# Patient Record
Sex: FEMALE | Race: WHITE | Employment: FULL TIME | URBAN - METROPOLITAN AREA
[De-identification: names, ages, dates, MRNs, and addresses within clinical notes are randomized per-mention and may not be internally consistent; named-entity substitution may affect disease eponyms.]

---

## 2016-12-13 LAB
ANTIBODY SCREEN, EXTERNAL: NEGATIVE
GRBS, EXTERNAL: POSITIVE
HBSAG, EXTERNAL: NEGATIVE
HIV, EXTERNAL: NEGATIVE
RPR, EXTERNAL: NONREACTIVE
RUBELLA, EXTERNAL: NORMAL
T. PALLIDUM, EXTERNAL: NEGATIVE
TYPE, ABO & RH, EXTERNAL: NORMAL
URINALYSIS, EXTERNAL: NORMAL

## 2017-02-21 ENCOUNTER — HOSPITAL ENCOUNTER (OUTPATIENT)
Dept: PERINATAL CARE | Age: 42
Discharge: HOME | End: 2017-02-21
Attending: OBSTETRICS & GYNECOLOGY
Payer: COMMERCIAL

## 2017-02-21 PROCEDURE — 76817 TRANSVAGINAL US OBSTETRIC: CPT | Performed by: OBSTETRICS & GYNECOLOGY

## 2017-02-21 PROCEDURE — 76811 OB US DETAILED SNGL FETUS: CPT | Performed by: OBSTETRICS & GYNECOLOGY

## 2017-02-28 ENCOUNTER — ANESTHESIA EVENT (OUTPATIENT)
Dept: LABOR AND DELIVERY | Age: 42
End: 2017-02-28
Payer: COMMERCIAL

## 2017-02-28 ENCOUNTER — ANESTHESIA (OUTPATIENT)
Dept: LABOR AND DELIVERY | Age: 42
End: 2017-02-28
Payer: COMMERCIAL

## 2017-02-28 ENCOUNTER — SURGERY (OUTPATIENT)
Age: 42
End: 2017-02-28

## 2017-02-28 ENCOUNTER — HOSPITAL ENCOUNTER (EMERGENCY)
Age: 42
Discharge: HOME | End: 2017-03-01
Attending: OBSTETRICS & GYNECOLOGY | Admitting: OBSTETRICS & GYNECOLOGY
Payer: COMMERCIAL

## 2017-02-28 ENCOUNTER — HOSPITAL ENCOUNTER (OUTPATIENT)
Dept: PERINATAL CARE | Age: 42
Discharge: HOME | End: 2017-02-28
Attending: OBSTETRICS & GYNECOLOGY
Payer: COMMERCIAL

## 2017-02-28 LAB
ABO + RH BLD: NORMAL
BLOOD GROUP ANTIBODIES SERPL: NORMAL
ERYTHROCYTE [DISTWIDTH] IN BLOOD BY AUTOMATED COUNT: 12.7 % (ref 11.5–14.5)
GLUCOSE FLD-MCNC: 37 MG/DL
HCT VFR BLD AUTO: 31.1 % (ref 35–47)
HGB BLD-MCNC: 10.4 G/DL (ref 11.5–16)
LDH FLD L TO P-CCNC: 84 U/L
MCH RBC QN AUTO: 27.9 PG (ref 26–34)
MCHC RBC AUTO-ENTMCNC: 33.4 G/DL (ref 30–36.5)
MCV RBC AUTO: 83.4 FL (ref 80–99)
PLATELET # BLD AUTO: 129 K/UL (ref 150–400)
RBC # BLD AUTO: 3.73 M/UL (ref 3.8–5.2)
SPECIMEN EXP DATE BLD: NORMAL
SPECIMEN SOURCE FLD: NORMAL
SPECIMEN SOURCE FLD: NORMAL
WBC # BLD AUTO: 5.8 K/UL (ref 3.6–11)

## 2017-02-28 PROCEDURE — 59000 AMNIOCENTESIS DIAGNOSTIC: CPT | Performed by: OBSTETRICS & GYNECOLOGY

## 2017-02-28 PROCEDURE — 74011000250 HC RX REV CODE- 250

## 2017-02-28 PROCEDURE — 74011250637 HC RX REV CODE- 250/637: Performed by: OBSTETRICS & GYNECOLOGY

## 2017-02-28 PROCEDURE — 74011250636 HC RX REV CODE- 250/636: Performed by: OBSTETRICS & GYNECOLOGY

## 2017-02-28 PROCEDURE — 77030007866 HC KT SPN ANES BBMI -B: Performed by: ANESTHESIOLOGY

## 2017-02-28 PROCEDURE — 85027 COMPLETE CBC AUTOMATED: CPT | Performed by: OBSTETRICS & GYNECOLOGY

## 2017-02-28 PROCEDURE — 36415 COLL VENOUS BLD VENIPUNCTURE: CPT | Performed by: OBSTETRICS & GYNECOLOGY

## 2017-02-28 PROCEDURE — 76817 TRANSVAGINAL US OBSTETRIC: CPT | Performed by: OBSTETRICS & GYNECOLOGY

## 2017-02-28 PROCEDURE — 82945 GLUCOSE OTHER FLUID: CPT | Performed by: OBSTETRICS & GYNECOLOGY

## 2017-02-28 PROCEDURE — 99283 EMERGENCY DEPT VISIT LOW MDM: CPT

## 2017-02-28 PROCEDURE — 75410000003 HC RECOV DEL/VAG/CSECN EA 0.5 HR: Performed by: OBSTETRICS & GYNECOLOGY

## 2017-02-28 PROCEDURE — 83615 LACTATE (LD) (LDH) ENZYME: CPT | Performed by: OBSTETRICS & GYNECOLOGY

## 2017-02-28 PROCEDURE — 76060000078 HC EPIDURAL ANESTHESIA: Performed by: OBSTETRICS & GYNECOLOGY

## 2017-02-28 PROCEDURE — 76010000394 HC LDRP PROCEDURE 1.5 TO 2 HR: Performed by: OBSTETRICS & GYNECOLOGY

## 2017-02-28 PROCEDURE — 99218 HC RM OBSERVATION: CPT

## 2017-02-28 PROCEDURE — 76946 ECHO GUIDE FOR AMNIOCENTESIS: CPT | Performed by: OBSTETRICS & GYNECOLOGY

## 2017-02-28 PROCEDURE — 87205 SMEAR GRAM STAIN: CPT | Performed by: OBSTETRICS & GYNECOLOGY

## 2017-02-28 PROCEDURE — 82106 ALPHA-FETOPROTEIN AMNIOTIC: CPT | Performed by: OBSTETRICS & GYNECOLOGY

## 2017-02-28 PROCEDURE — 77030012890

## 2017-02-28 PROCEDURE — 86900 BLOOD TYPING SEROLOGIC ABO: CPT | Performed by: OBSTETRICS & GYNECOLOGY

## 2017-02-28 PROCEDURE — 74011250636 HC RX REV CODE- 250/636

## 2017-02-28 PROCEDURE — 88269 CHROMOSOME ANALYS AMNIOTIC: CPT | Performed by: OBSTETRICS & GYNECOLOGY

## 2017-02-28 RX ORDER — SODIUM CHLORIDE, SODIUM LACTATE, POTASSIUM CHLORIDE, CALCIUM CHLORIDE 600; 310; 30; 20 MG/100ML; MG/100ML; MG/100ML; MG/100ML
125 INJECTION, SOLUTION INTRAVENOUS CONTINUOUS
Status: DISCONTINUED | OUTPATIENT
Start: 2017-02-28 | End: 2017-03-01 | Stop reason: HOSPADM

## 2017-02-28 RX ORDER — CEFAZOLIN SODIUM IN 0.9 % NACL 2 G/50 ML
2 INTRAVENOUS SOLUTION, PIGGYBACK (ML) INTRAVENOUS EVERY 8 HOURS
Status: COMPLETED | OUTPATIENT
Start: 2017-02-28 | End: 2017-03-01

## 2017-02-28 RX ORDER — CEFAZOLIN SODIUM IN 0.9 % NACL 2 G/50 ML
2 INTRAVENOUS SOLUTION, PIGGYBACK (ML) INTRAVENOUS ONCE
Status: COMPLETED | OUTPATIENT
Start: 2017-02-28 | End: 2017-02-28

## 2017-02-28 RX ORDER — PROGESTERONE 200 MG/1
200 CAPSULE ORAL DAILY
COMMUNITY
End: 2017-03-01

## 2017-02-28 RX ORDER — ACETAMINOPHEN 500 MG
500 TABLET ORAL
Status: DISCONTINUED | OUTPATIENT
Start: 2017-02-28 | End: 2017-03-01 | Stop reason: HOSPADM

## 2017-02-28 RX ORDER — BUPIVACAINE HYDROCHLORIDE 7.5 MG/ML
INJECTION, SOLUTION EPIDURAL; RETROBULBAR AS NEEDED
Status: DISCONTINUED | OUTPATIENT
Start: 2017-02-28 | End: 2017-02-28 | Stop reason: HOSPADM

## 2017-02-28 RX ORDER — ZOLPIDEM TARTRATE 5 MG/1
5 TABLET ORAL
Status: DISCONTINUED | OUTPATIENT
Start: 2017-02-28 | End: 2017-03-01 | Stop reason: HOSPADM

## 2017-02-28 RX ORDER — SODIUM CHLORIDE, SODIUM LACTATE, POTASSIUM CHLORIDE, CALCIUM CHLORIDE 600; 310; 30; 20 MG/100ML; MG/100ML; MG/100ML; MG/100ML
INJECTION, SOLUTION INTRAVENOUS
Status: DISCONTINUED | OUTPATIENT
Start: 2017-02-28 | End: 2017-02-28 | Stop reason: HOSPADM

## 2017-02-28 RX ADMIN — SODIUM CHLORIDE, SODIUM LACTATE, POTASSIUM CHLORIDE, CALCIUM CHLORIDE: 600; 310; 30; 20 INJECTION, SOLUTION INTRAVENOUS at 13:15

## 2017-02-28 RX ADMIN — CEFAZOLIN 2 G: 1 INJECTION, POWDER, FOR SOLUTION INTRAMUSCULAR; INTRAVENOUS; PARENTERAL at 20:54

## 2017-02-28 RX ADMIN — ZOLPIDEM TARTRATE 5 MG: 5 TABLET ORAL at 22:17

## 2017-02-28 RX ADMIN — BUPIVACAINE HYDROCHLORIDE 9 MG: 7.5 INJECTION, SOLUTION EPIDURAL; RETROBULBAR at 13:21

## 2017-02-28 RX ADMIN — CEFAZOLIN 2 G: 1 INJECTION, POWDER, FOR SOLUTION INTRAMUSCULAR; INTRAVENOUS; PARENTERAL at 13:15

## 2017-02-28 RX ADMIN — SODIUM CHLORIDE, SODIUM LACTATE, POTASSIUM CHLORIDE, CALCIUM CHLORIDE: 600; 310; 30; 20 INJECTION, SOLUTION INTRAVENOUS at 13:59

## 2017-02-28 RX ADMIN — ACETAMINOPHEN 500 MG: 500 TABLET, FILM COATED ORAL at 15:47

## 2017-02-28 NOTE — PROGRESS NOTES
65 Dr. Nitesh Garrett at bedside discussing [address] [facility].    [phone] Second sample drawn and sent to lab.

## 2017-02-28 NOTE — ANESTHESIA PREPROCEDURE EVALUATION
Anesthetic History   No history of anesthetic complications            Review of Systems / Medical History  Patient summary reviewed, nursing notes reviewed and pertinent labs reviewed    Pulmonary  Within defined limits                 Neuro/Psych   Within defined limits           Cardiovascular  Within defined limits                     GI/Hepatic/Renal  Within defined limits              Endo/Other  Within defined limits           Other Findings              Physical Exam    Airway  Mallampati: II  TM Distance: > 6 cm  Neck ROM: normal range of motion   Mouth opening: Normal     Cardiovascular  Regular rate and rhythm,  S1 and S2 normal,  no murmur, click, rub, or gallop             Dental  No notable dental hx       Pulmonary  Breath sounds clear to auscultation               Abdominal  GI exam deferred       Other Findings            Anesthetic Plan    ASA: 2  Anesthesia type: spinal          Induction: Intravenous  Anesthetic plan and risks discussed with: Patient

## 2017-02-28 NOTE — IP AVS SNAPSHOT
Current Discharge Medication List  
  
Take these medications as directed Dose & Instructions Dispensing Information Comments Morning Noon Evening Bedtime  
 prenatal multivit-ca-min-fe-fa Tab Your next dose is: Today, Tomorrow Other:  ____________ Take  by mouth. Refills:  0

## 2017-02-28 NOTE — PROGRESS NOTES
Spiritual Care Assessment/Progress Notes    Concha Lombard [id number]  [id number]    1975  46 y.o.  female    Patient Telephone Number: [phone] (home)   Catholic Affiliation:    Language: English   Extended Emergency Contact Information  Primary Emergency Contact: Bao David Phone: [phone]  Relation: Spouse   There are no active problems to display for this patient. Date: 2/28/2017       Level of Catholic/Spiritual Activity:  [x]         Involved in omkar tradition/spiritual practice    []         Not involved in omkar tradition/spiritual practice  []         Spiritually oriented    []         Claims no spiritual orientation    []         seeking spiritual identity  []         Feels alienated from Cheondoism practice/tradition  []         Feels angry about Cheondoism practice/tradition  [x]         Spirituality/Cheondoism tradition is a resource for coping at this time.   []         Not able to assess due to medical condition    Services Provided Today:  []         crisis intervention    []         reading Scriptures  [x]         spiritual assessment    [x]         prayer  [x]         empathic listening/emotional support  []         rites and rituals (cite in comments)  []         life review     []         Cheondoism support  []         theological development   []         advocacy  []         ethical dialog     []         blessing  []         bereavement support    []         support to family  []         anticipatory grief support   []         help with AMD  []         spiritual guidance    []         meditation      Spiritual Care Needs  [x]         Emotional Support  [x]         Spiritual/Catholic Care  []         Loss/Adjustment  []         Advocacy/Referral                /Ethics  []         No needs expressed at               this time  []         Other: (note in               comments)  [address]  [x]         Follow up visits with pt/family  []         Provide materials  []         Schedule sacraments  []         Contact Community               Clergy  []         Follow up as needed  []         Other: (note in               comments)     Comments: Initial visit with patient and  Lul Wen) in [phone] per patient request due to concerns about missing their Reji Wednesday service. Patient shared their history of a miscarriage and difficulty conceiving as well the emotional events of today that lead to her hospitalization. Patient maintains a hopeful but realistic outlook. Patient and  are comforted by their [address] and were extremely appreciative of the availability of Spiritual Care services. Also informed patient and  of Goldie on channel 39 and [employer]. Chaplain Jahaira Gann, James.    Paging Service [phone])

## 2017-02-28 NOTE — H&P
Maternal-Fetal Medicine    Ms. Dimitri Ferrer  at 21-weeks gestation, with cervical incompetence returned for ultrasound evaluation today. She conceived by IVF-ET and PGD and non-invasive prenatal screening showed no increased risk for fetal aneuploidies. On ultrasound performed last week, the cervix was short (7 millimeters) and, after counseling, the patient opted for vaginal progesterone treatment. She has been taking progesterone for one week now. Patient does not have symptoms of pelvic pressure or vaginal bleeding. PMH: She does not have any chronic medical conditions. PSH: Laparoscopic myomectomy and salpingectomy. Hysteroscopies and D&C procedures. No history of cervical surgeries. Obstetric history : 1 early miscarriage. Gyn history: No history of abnormal Pap smears. History of genital herpes infection with no active lesions now. She denies gonococcal or chlamydial infections. P/E: Patient is comfortably sitting up; not in distress. Abd: Soft gravid uterus; no tenderness. Before ultrasound, I explained that I will be performing transvaginal ultrasound and sterile-speculum examination. On ultrasound, amniotic fluid is normal and good fetal activity is seen. Cephalic presentation. On transabdominal scan, the cervical canal is dilated. On transvaginal ultrasound, the cervical canal is completely dilated and there is no residual measurable cervical length. On sterile-speculum examination, the cervix is about 3 to 4 centimeters dilated and the membranes are outside the external os into the vagina. It appears it is just outside the external os. On visual inspection, no herpetic lesions are seen on the external genitalia. I counseled the couple on the following:  Advanced cervical dilation: I explained the finding with the help of ultrasound images and diagrams.  I also informed them that the information regarding the extent of membrane prolapse is difficult to evaluate on office examination and will be possible only under anesthesia. I discussed the following options: a) expectant management with no surgical intervention or b) rescue cerclage. I discussed cerclage procedure. I also informed them that a decision may be made not to perform cerclage if the findings under anesthesia show that the membranes could not be retracted into the cervical canal. Also, there is a greater risk of rupture of membranes while attempting to perform the procedure. Other complications of cerclage include infection, hemorrhage, and injury to bladder or bowel (rare). I told them that the success rate (if cerclage is possible) is probably around 10 to 20% of carrying the pregnancy beyond 32 weeks. Expectant management or cerclage procedure can be associated with continuation of pregnancy to variable gestational age, and there is a high-likelihood of delivery under 32 weeks gestation. Delivery of an extremely-premature can be associated with neurological complications including cerebral palsy. I discussed amniocentesis that is an essential part to perform cerclage. Amniocentesis is performed primarily to rule out intraamniotic infection, and if infection is present, it precludes cerclage procedure. The couple discussed between themselves for several minutes and opted to have examination under anesthesia and cerclage procedure if possible. Patient does not want expectant management. She also consented for amniocentesis. In addition to infectious work-up, she requested fetal karyotype. I discussed the procedure-related loss of about 1 in 400, but her chances of miscarriage is very high that is independent of the procedure. I also informed her that additional fluid may be withdrawn to decompress the amniotic sac (to facilitate cerclage), but the benefit is doubtful. After amniocentesis, the patient was taken to L&D in a wheelchair.  We will perform examination under anesthesia and cerclage (if possible) after intraamniotic infection is ruled out. After informed consent amniocentesis was performed. After discarding initial 1 mL of amniotic fluid, about 10 mL of amniotic fluid was withdrawn and sent for WBC, glucose, gram stain, LDH and culture. Additional 40 milliliters of amniotic fluid was withdrawn and was sent for fetal karyotype. Patient tolerated the procedure well. The amniotic fluid specimen was taken to L&D by our nurse, Adarsh Rubio and the fluid will be sent from there to the lab. Results of amniocentesis:  -Glucose 37  -LD 84  -Gram stain: No definite organism is seen (preliminary). Plan: Proceed with cerclage. Informed consent for the procedure was obtained. Discussed with Dr. Tad Smith.

## 2017-02-28 NOTE — PROGRESS NOTES
1045- Pt arrived in L&D via wheelchair from Dr. Adilia Savage office for a rescue cerclage. Pt states positive fetal movement and denies any leaking or bleeding. Pt's cervix was visually dilated to 4cm per Dr. Niya Hubbard, plan to admit and do rescue cerclage as long amniotic fluid is negative for infection. 1310- Pt off monitor for transport via wheelchair to OR 2 for cerclage     1940- Bedside and Verbal shift change report given to ELIZABETH Mooney (oncoming nurse) by ELIZABETH Singh RN (offgoing nurse). Report included the following information SBAR, Intake/Output, MAR and Recent Results.

## 2017-02-28 NOTE — BRIEF OP NOTE
BRIEF OPERATIVE NOTE    Date of Procedure: 2/28/2017   Preoperative Diagnosis: 21-weeks' gestation with INCOMPETENT CERVIX  Postoperative Diagnosis: Same as above with cerclage. Procedure(s):CERCLAGE  Surgeon(s) and Role:     * Rogelio Crystal MD - Primary     * Tisha Izquierdo MD - Assisting            Surgical Staff:  Circ-1: Efraín Elias RN  Scrub Tech-1: Tori Welch  Float Staff: Michelle Hall RN  No case tracking events are documented in the log. Anesthesia: Epidural   Estimated Blood Loss: 100 milliliters  Specimens: * No specimens in log *   Findings: Cervix 3 to 4 cm dilated and the bag of membrane just outside the external os. Posterior lip of the cervix is deficient (effacement).   Complications: None  Implants: * No implants in log *

## 2017-02-28 NOTE — IP AVS SNAPSHOT
[address] [facility] [address] 
[phone] Patient: Kiana Alvarado MRN: [id number] QSL:1/37/8115 You are allergic to the following No active allergies Recent Documentation Height  
  
  
  
  
  
 1.524 m Unresulted Labs Order Current Status CHROMOSOME ANALYSIS, AMNIOTIC FLUID In process MISC. LAB TEST In process CULTURE, BODY FLUID W GRAM STAIN Preliminary result Emergency Contacts Name Discharge Info Relation Home Work Mobile Nabor Barton  Spouse [3] [phone] About your hospitalization You were admitted on:  February 28, 2017 You last received care in the:  [facility] 3 LABOR & DELIVERY You were discharged on:  March 1, 2017 Unit phone number:  [phone] Why you were hospitalized Your primary diagnosis was:  Not on File Providers Seen During Your Hospitalizations Provider Role Specialty Primary office phone Mague Celis MD Attending Provider Obstetrics & Gynecology [phone] Your Primary Care Physician (PCP) Primary Care Physician Office Phone Office Fax [facility] Staff [phone] Follow-up Information Follow up With Details Comments Contact Info Paolo Benjamin MD  March 7 10:15am [facility] 
[address] [facility] [address] 
[phone] Mague Celis MD   [address] [facility] [address] [facility] 13 
[phone] Current Discharge Medication List  
  
CONTINUE these medications which have NOT CHANGED Dose & Instructions Dispensing Information Comments Morning Noon Evening Bedtime  
 prenatal multivit-ca-min-fe-fa Tab Your next dose is: Today, Tomorrow Other:  _________ Take  by mouth. Refills:  0 STOP taking these medications   
 progesterone 200 mg capsule Commonly known as:  PROMETRIUM  
   
  
  
 
  
  
 Discharge Instructions  DISCHARGE INSTRUCTIONS Name: Magdalena Henderson YOB: 1975 Primary Diagnosis: Active Problems: * No active hospital problems. * Introduction: You have visited the hospital because you thought you were in  labor. These guidelines are for your information at home to help prevent repeated problems. In general, you should remember: 
? Empty your bladder every 2-3 hours. ? Avoid breast stimulation (including showers where the water stream is on your breasts)-this can cause contractions. ? Rest means lying down. ? Contractions and cramping happen more often in evening and nighttime. ? No intercourse or sexual stimulation without asking your doctor. ? Try to arrange for help with housework and . General:  
 
 
 
Diet/Diet Restrictions:   
 
Anything you like/tolerate Physical Activity / Restrictions / Safety:  
 
* Activity at home is based on how strong your  labor has been, You should follow the following activity guidelines. As tolerated, avoid heavy lifting. Discharge Instructions/ Special Treatment/ Home Care Needs:  
 
Call your provider if: 
? Uterine cramping (menstrual-like cramps, intermittent or constant ? Uterine contractions every 10-15 minutes or more frequently ? Low abdominal pressure ( pelvic pressure) ? Dull low backache (intermittent or constant) ? Increase or change in vaginal discharge ? Feeling that the baby is \"pushing down\" ? Abdominal cramping with or without diarrhea If any of these symptoms are experienced, stop what you are doing, lie down on your side, drink two to three glasses of water and wait one hour. If the symptoms persist or get worse, call your provider.  
 
Pain Management:  
 
 
 
 
Signed By: Arvin Santizo RN                                                                                                   Date: 3/1/2017 Time: 9:25 AM 
 
 Discharge Checklist-NURSING TO COMPLETE:  
 
Date and Time of Discharge: Date: 3/1/2017 Time: 9:25 AM 
 
Return of:  
Dental Appliance: Dental Appliances: None Vision: Visual Aid: None Hearing Aid:   
Jewelry: Jewelry: Bracelet Clothing: Clothing: At bedside, With patient Other Valuables: Other Valuables: At bedside, Stella Sosa Valuables sent to safe: Personal Items Sent to Safe: none Prescription Given: no 
Medication Instruction Sheet(s), including side effects, provided: no 
 
Accompanied By: Family Mode of Transportation: 
 
Discharge Disposition: Home I have had the opportunity to make my options or choices for discharge. I have received and understand these instructions. DISCHARGE SUMMARY from Nurse The following personal items are in your possession at time of discharge: 
 
Dental Appliances: None Visual Aid: None Home Medications: None Jewelry: Marcy Infield Clothing: At bedside, With patient Other Valuables: At bedside, Stella Sosa Personal Items Sent to Safe: none PATIENT INSTRUCTIONS: 
 
 
F-face looks uneven A-arms unable to move or move unevenly S-speech slurred or non-existent T-time-call 911 as soon as signs and symptoms begin-DO NOT go Back to bed or wait to see if you get better-TIME IS BRAIN. Warning Signs of HEART ATTACK Call 911 if you have these symptoms: 
? Chest discomfort. Most heart attacks involve discomfort in the center of the chest that lasts more than a few minutes, or that goes away and comes back. It can feel like uncomfortable pressure, squeezing, fullness, or pain. ? Discomfort in other areas of the upper body. Symptoms can include pain or discomfort in one or both arms, the back, neck, jaw, or stomach. ? Shortness of breath with or without chest discomfort. ? Other signs may include breaking out in a cold sweat, nausea, or lightheadedness. Don't wait more than five minutes to call [address]! Fast action can save your life. Calling 911 is almost always the fastest way to get lifesaving treatment. Emergency Medical Services staff can begin treatment when they arrive  up to an hour sooner than if someone gets to the hospital by car. The discharge information has been reviewed with the patient. The patient verbalized understanding. Discharge Orders None Introducing [facility] SERVICES! Colleen Dickey introduces [employer] patient portal. Now you can access parts of your medical record, email your doctor's office, and request medication refills online. 1. In your internet browser, go to [email or web address] 2. Click on the First Time User? Click Here link in the Sign In box. You will see the New Member Sign Up page. 3. Enter your Look[employer] Access Code exactly as it appears below. You will not need to use this code after youve completed the sign-up process. If you do not sign up before the expiration date, you must request a new code. · [employer] Access Code: [email or web address] Expires: 5/21/2017  2:04 PM 
 
4. Enter the last four digits of your Social Security Number (xxxx) and Date of Birth (mm/dd/yyyy) as indicated and click Submit. You will be taken to the next sign-up page. 5. Create a Lookw[employer] ID. This will be your Look[employer] login ID and cannot be changed, so think of one that is secure and easy to remember. 6. Create a [employer] password. You can change your password at any time. 7. Enter your Password Reset Question and Answer. This can be used at a later time if you forget your password. 8. Enter your e-mail address. You will receive e-mail notification when new information is available in [address] 9. Click Sign Up. You can now view and download portions of your medical record.  
10. Click the Download Summary menu link to download a portable copy of your medical information. If you have questions, please visit the Frequently Asked Questions section of the [employer] website. Remember, MyChart is NOT to be used for urgent needs. For medical emergencies, dial 911. Now available from your iPhone and Android! General Information Please provide this summary of care documentation to your next provider. Patient Signature:  ____________________________________________________________ Date:  ____________________________________________________________  
  
[facility] Provider Signature:  ____________________________________________________________ Date:  ____________________________________________________________

## 2017-02-28 NOTE — OP NOTES
OPERATIVE REPORT   PREOPERATIVE DIAGNOSIS: A 21-week pregnancy with cervical incompetence. POSTOPERATIVE DIAGNOSIS: A 21-week pregnancy with cervical incompetence. OPERATIVE PROCEDURE: Cervical cerclage. SURGEON: Curtis Marie MD   ASSISTANT: Gary Lechuga MD  ANESTHESIA: Spinal.   FINDINGS: Cervix 3 to 4 cm dilated with membranes just outside the external os. Posterior lip of the cervix was deficient because of effacement. COMPLICATIONS: None. DESCRIPTION OF PROCEDURE: Before transferring to OR, I explained the procedures and complications of cervical cerclage that include rupture of membranes, hemorrhage, infection, and injury to bladder or bowel. I also informed her that cerclage may not be performed if findings suggest the risk of rupture of membranes is greater than the benefit. Informed consent was obtained, and the patient was transferred to the operating room. Spinal anesthesia was administered by the anesthesiologist. The patient was then placed in the dorsal lithotomy position and vulva were   prepped with Betadine and draped. Under vision with sterile speculum the vagina was swabbed with betadine. A Garcia catheter was inserted into the bladder and about 500 milliliters of normal saline was instilled into the bladder to facilitate retraction of amniotic membrane into the uterine cavity. A weighted speculum was then nserted into the vagina and  the anterior lip of the cervix was held by a pair of ring forceps. A Garcia balloon (No 20 catheter) was inflated with about 7 mL of saline and the membrane (now slightly retracted) was gently pushed into the uterine cavity and held in place by the assistant. A 5 mm Mersilene tape stitch was taken at the 11-o'clock position and the suture was placed circumferentially to exit at the 1-o'clock position. Difficulty was experienced while placing the stitch at 7 to 5 O'clock position because of effacement of cervix.   The needle was cut off and about 5 knots were placed anteriorly. Good hemostasis was achieved. Bladder was emptied completely and no hematuria was seen. A rectal examination performed ruled out inadvertent placement of stitch on the bowel. During the procedure, about 100 mL of saline from the bladder was drained to gain access to the cervix. Another 100 mL was later instilled (after taking the posterior stitch) to facilitate the retraction of membrane, which became visible. All the instruments and sponges were removed from the vagina and the count   was correct. The patient was then transferred to labor and delivery in   stable condition. ESTIMATED BLOOD LOSS: 100 mL. SPECIMENS: None.   Josephine Kenyon MD  2/28/2017

## 2017-02-28 NOTE — ANESTHESIA PROCEDURE NOTES
Spinal Block    Start time: 2/28/2017 1:16 PM  End time: 2/28/2017 1:20 PM  Performed by: Tu Will  Authorized by: Tu Will     Pre-procedure:   Indications: primary anesthetic  Preanesthetic Checklist: patient identified, risks and benefits discussed, anesthesia consent, patient being monitored and timeout performed    Timeout Time: 13:16          Spinal Block:   Patient Position:  Seated    Prep: Betadine      Location:  L3-4  Technique:  Single shot        Needle:   Needle Type:  Pencil-tip  Needle Gauge:  25 G  Attempts:  1      Events: CSF confirmed, no blood with aspiration and no paresthesia        Assessment:  Insertion:  Uncomplicated  Patient tolerance:  Patient tolerated the procedure well with no immediate complications

## 2017-03-01 VITALS
WEIGHT: 137 LBS | RESPIRATION RATE: 16 BRPM | OXYGEN SATURATION: 99 % | HEIGHT: 60 IN | SYSTOLIC BLOOD PRESSURE: 91 MMHG | BODY MASS INDEX: 26.9 KG/M2 | DIASTOLIC BLOOD PRESSURE: 58 MMHG | TEMPERATURE: 97.7 F | HEART RATE: 82 BPM

## 2017-03-01 PROCEDURE — 74011250636 HC RX REV CODE- 250/636: Performed by: OBSTETRICS & GYNECOLOGY

## 2017-03-01 PROCEDURE — 99218 HC RM OBSERVATION: CPT

## 2017-03-01 RX ADMIN — CEFAZOLIN 2 G: 1 INJECTION, POWDER, FOR SOLUTION INTRAMUSCULAR; INTRAVENOUS; PARENTERAL at 04:57

## 2017-03-01 NOTE — DISCHARGE INSTRUCTIONS
DISCHARGE INSTRUCTIONS    Name: Kiana Alvarado  YOB: 1975  Primary Diagnosis: Active Problems:    * No active hospital problems. *      Introduction: You have visited the hospital because you thought you were in  labor. These guidelines are for your information at home to help prevent repeated problems. In general, you should remember:   Empty your bladder every 2-3 hours.  Avoid breast stimulation (including showers where the water stream is on your breasts)-this can cause contractions.  Rest means lying down.  Contractions and cramping happen more often in evening and nighttime.  No intercourse or sexual stimulation without asking your doctor.  Try to arrange for help with housework and . General:         Diet/Diet Restrictions:      Anything you like/tolerate    Physical Activity / Restrictions / Safety:     * Activity at home is based on how strong your  labor has been, You should follow the following activity guidelines. As tolerated, avoid heavy lifting. Discharge Instructions/ Special Treatment/ Home Care Needs:     Call your provider if:   Uterine cramping (menstrual-like cramps, intermittent or constant   Uterine contractions every 10-15 minutes or more frequently   Low abdominal pressure ( pelvic pressure)   Dull low backache (intermittent or constant)   Increase or change in vaginal discharge   Feeling that the baby is \"pushing down\"   Abdominal cramping with or without diarrhea  If any of these symptoms are experienced, stop what you are doing, lie down on your side, drink two to three glasses of water and wait one hour. If the symptoms persist or get worse, call your provider.     Pain Management:           Signed By: Karen Cuba RN                                                                                                   Date: 3/1/2017 Time: 9:25 AM    Discharge Checklist-NURSING TO COMPLETE:     Date and Time of Discharge: Date: 3/1/2017 Time: 9:25 AM    Return of:   Dental Appliance: Dental Appliances: None  Vision: Visual Aid: None  Hearing Aid:    Jewelry: Jewelry: Bracelet  Clothing: Clothing: At bedside, With patient  Other Valuables: Other Valuables: At bedside, Cell Phone, Wilnette Gosselin, Rahu 37 sent to safe: Personal Items Sent to Safe: none    Prescription Given: no  Medication Instruction Sheet(s), including side effects, provided: no    Accompanied By: Family    Mode of Transportation:    Discharge Disposition: Home    I have had the opportunity to make my options or choices for discharge. I have received and understand these instructions. DISCHARGE SUMMARY from Nurse    The following personal items are in your possession at time of discharge:    Dental Appliances: None  Visual Aid: None     Home Medications: None  Jewelry: Bracelet  Clothing: At bedside, With patient  Other Valuables: At bedside, Cell Phone, Hipolito Moritz Items Sent to Safe: none          PATIENT INSTRUCTIONS:    After general anesthesia or intravenous sedation, for 24 hours or while taking prescription Narcotics:  · Limit your activities  · Do not drive and operate hazardous machinery  · Do not make important personal or business decisions  · Do  not drink alcoholic beverages  · If you have not urinated within 8 hours after discharge, please contact your surgeon on call. Report the following to your surgeon:  · Excessive pain, swelling, redness or odor of or around the surgical area  · Temperature over 100.5  · Nausea and vomiting lasting longer than 4 hours or if unable to take medications  · Any signs of decreased circulation or nerve impairment to extremity: change in color, persistent  numbness, tingling, coldness or increase pain  · Any questions    *  Please give a list of your current medications to your Primary Care Provider.     *  Please update this list whenever your medications are discontinued, doses are      changed, or new medications (including over-the-counter products) are added. *  Please carry medication information at all times in case of emergency situations. These are general instructions for a healthy lifestyle:    No smoking/ No tobacco products/ Avoid exposure to second hand smoke    Surgeon General's Warning:  Quitting smoking now greatly reduces serious risk to your health. Obesity, smoking, and sedentary lifestyle greatly increases your risk for illness    A healthy diet, regular physical exercise & weight monitoring are important for maintaining a healthy lifestyle    You may be retaining fluid if you have a history of heart failure or if you experience any of the following symptoms:  Weight gain of 3 pounds or more overnight or 5 pounds in a week, increased swelling in our hands or feet or shortness of breath while lying flat in bed. Please call your doctor as soon as you notice any of these symptoms; do not wait until your next office visit. Recognize signs and symptoms of STROKE:    F-face looks uneven    A-arms unable to move or move unevenly    S-speech slurred or non-existent    T-time-call 911 as soon as signs and symptoms begin-DO NOT go       Back to bed or wait to see if you get better-TIME IS BRAIN. Warning Signs of HEART ATTACK     Call 911 if you have these symptoms:   Chest discomfort. Most heart attacks involve discomfort in the center of the chest that lasts more than a few minutes, or that goes away and comes back. It can feel like uncomfortable pressure, squeezing, fullness, or pain.  Discomfort in other areas of the upper body. Symptoms can include pain or discomfort in one or both arms, the back, neck, jaw, or stomach.  Shortness of breath with or without chest discomfort.  Other signs may include breaking out in a cold sweat, nausea, or lightheadedness. Don't wait more than five minutes to call 911 - MINUTES MATTER!  Fast action can save your life. Calling 911 is almost always the fastest way to get lifesaving treatment. Emergency Medical Services staff can begin treatment when they arrive -- up to an hour sooner than if someone gets to the hospital by car. The discharge information has been reviewed with the patient. The patient verbalized understanding.

## 2017-03-01 NOTE — ANESTHESIA POSTPROCEDURE EVALUATION
Post-Anesthesia Evaluation and Assessment    Patient: Jovany Little MRN: [id number]  SSN: [id number]    YOB: 1975  Age: 46 y.o. Sex: female       Cardiovascular Function/Vital Signs  Visit Vitals    BP 96/61    Pulse 83    Temp 36.7 °C (98 °F)    Resp 18    Ht 5' (1.524 m)    Wt 62.1 kg (137 lb)    SpO2 99%    Breastfeeding No    BMI 26.76 kg/m2       Patient is status post spinal anesthesia for Procedure(s):  CERCLAGE. Nausea/Vomiting: None    Postoperative hydration reviewed and adequate. Pain:  Pain Scale 1: Numeric (0 - 10) (02/28/17 2059)  Pain Intensity 1: 0 (02/28/17 2059)   Managed    Neurological Status: At baseline    Mental Status and Level of Consciousness: Arousable    Pulmonary Status:   O2 Device: Room air (02/28/17 1500)   Adequate oxygenation and airway patent    Complications related to anesthesia: None    Post-anesthesia assessment completed.  No concerns    Signed By: Ana Maria Barriga MD     February 28, 2017

## 2017-03-01 NOTE — PROGRESS NOTES
1930 Bedside SBAR report received from [address] [place] [address],[address] Telephone report to Dr Kishor Valderrama.  Order received to remove continuous toco at this time and monitor only if pt feels contractions    2010 [place] off at this time    0740 Bedside SBAR report given to [employer] RN

## 2017-03-01 NOTE — PROGRESS NOTES
0735-Bedside shift change report given to STU ThompsonRN (oncoming nurse) by Beverly Wilburn (offgoing nurse).  Report included the following information SBAR, Intake/Output and MAR.     0835-pt up to [facility] per pt request to have bowel movement    0850-Dr Niya Hubbard called to check on the pt, plan is for discharge once Dr Leia Chavez rounds on the pt, no ultrasound this morning, ultrasound scheduled next week March 7 at 10:15am in the perinatology center at [facility], pt aware    0915-Dr Lujan at the bedside discussing plan of care/discharge    1030-pt given discharge instructions, verbalized understanding, multiple questions answered, leaving with , has next appointment scheduled (as noted above), pt wheeled out in wheelchair for discharge

## 2017-03-01 NOTE — PROGRESS NOTES
AntePartum Progress Note    Debra Graham  21w2d    Patient admitted for cervical incompetence 21w2d Estimated Date of Delivery: 7/10/17 states she does have normal fetal movement and does not  have  abdominal pain  , contractions and vaginal leaking of fluid . Some vaginal spotting. Feeling well    Vitals:  Patient Vitals for the past 24 hrs:   BP Temp Pulse Resp SpO2 Height Weight   17 0750 91/58 97.7 °F (36.5 °C) 82 16 - - -   17 0409 92/57 97.9 °F (36.6 °C) 84 16 - - -   17 2059 96/61 98 °F (36.7 °C) 83 18 - - -   17 1925 99/60 97.4 °F (36.3 °C) 80 13 - - -   17 1729 113/83 - 84 - 99 % - -   17 1715 95/67 - 90 - - - -   17 1700 110/45 - 79 - 99 % - -   17 1645 104/66 - - - - - -   17 1629 96/51 - - - - - -   17 1614 100/59 - 79 - - - -   17 1559 100/53 - 75 - - - -   17 1544 103/55 - 79 - 99 % - -   17 1529 112/65 - 83 - 99 % - -   17 1514 112/60 - - - - - -   17 1500 105/62 97.2 °F (36.2 °C) 97 14 99 % - -   17 1458 105/62 97.2 °F (36.2 °C) 69 13 99 % - -   17 1106 - - - - - 5' (1.524 m) 62.1 kg (137 lb)   17 1105 110/71 - 91 - - - -     Temp (24hrs), Av.6 °F (36.4 °C), Min:97.2 °F (36.2 °C), Max:98 °F (36.7 °C)    I&O:                  1901 -  0700  In: 1000 [I.V.:1000]  Out: 600 [Urine:600]    Uterine Activity: None    Exam:  Patient without distress.                Abdomen soft, non-tender               Fundus soft and non tender               Lower extremities edema No                                           Labs:   Recent Results (from the past 24 hour(s))   CBC W/O DIFF    Collection Time: 17 10:57 AM   Result Value Ref Range    WBC 5.8 3.6 - 11.0 K/uL    RBC 3.73 (L) 3.80 - 5.20 M/uL    HGB 10.4 (L) 11.5 - 16.0 g/dL    HCT 31.1 (L) 35.0 - 47.0 %    MCV 83.4 80.0 - 99.0 FL    MCH 27.9 26.0 - 34.0 PG    MCHC 33.4 30.0 - 36.5 g/dL    RDW 12.7 11.5 - 14.5 %    PLATELET 574 (L) 150 - 400 K/uL   CULTURE, BODY FLUID W GRAM STAIN    Collection Time: 02/28/17 10:57 AM   Result Value Ref Range    Special Requests: NO SPECIAL REQUESTS      GRAM STAIN NO WBC'S SEEN      GRAM STAIN NO DEFINITE ORGANISM SEEN      Culture result: PENDING    GLUCOSE, FLUID    Collection Time: 02/28/17 10:57 AM   Result Value Ref Range    Fluid Type: AMNIOTIC FLUID      Glucose, body fld. 37 MG/DL   LDH, BODY FLUID    Collection Time: 02/28/17 10:57 AM   Result Value Ref Range    Fluid Type: AMNIOTIC FLUID      LD, body fld. 84 U/L   TYPE & SCREEN    Collection Time: 02/28/17 11:40 AM   Result Value Ref Range    Crossmatch Expiration 03/03/2017     ABO/Rh(D) A POSITIVE     Antibody screen NEG        Assessment and Plan:   IUP @ 21w2d   There are no active problems to display for this patient. IUP at 21w POD 1 from rescue cerclage. No evidence ctx, ROM or IUI. D/c home. Reviewed instructions for activity and F/U.  Questions answered

## 2017-03-01 NOTE — PROGRESS NOTES
Followed up with patient as per request of Abilio Zhao. Provided distribution of ashes Abby Donato Wednesday) to Ms. Angelica Sahu and to her  Nabor---as they were unable to attend service at their [place] the [facility]. Prior to ashes both described what had happened over the last 24 hours---physically and emotionally. Osmin Syed became tearful as he described the sense of powerlessness to help his wife on some concrete way. Reported that they have a solid relationship and are able to share feelings openly. Have been attempting to have a child for 5 years---have had one miscarriage. Di Lovell expressed a hope that she is able to carry the baby for several more weeks. Is currently at 21 weeks, 3 days. Spoke to them both of the journey of patience, longing, and hope that is the [facility] experience. Di Lovell catherine the parallel to her own journey immediately. Also suggested short-term daily goals, rather then looking too far ahead regarding her pregnancy. Finally, suggested reaching out to their Religion for communal prayer support---where  by Tang. Alli Abrams. Offered spoken prayer and anointed with ashjeb. Provided assurance of prayer and reminded of  availability. [facility]'s Staff  ([place] [facility] Patient Care Specialist)   Paging Service [phone])

## 2017-03-03 LAB
AFP ADJ MOM AMN: 0.74
AFP ADJ MOM AMN: NORMAL
AFP AMN-MCNC: 4.1 UG/ML
AFP AMN-MCNC: NORMAL UG/L
AFP INTERP AMN-IMP: NORMAL
GA (WEEKS): 21 WK

## 2017-03-04 LAB
BACTERIA SPEC CULT: NORMAL
GRAM STN SPEC: NORMAL
GRAM STN SPEC: NORMAL
SERVICE CMNT-IMP: NORMAL

## 2017-03-07 ENCOUNTER — HOSPITAL ENCOUNTER (OUTPATIENT)
Dept: PERINATAL CARE | Age: 42
Discharge: HOME | End: 2017-03-07
Attending: OBSTETRICS & GYNECOLOGY | Admitting: OBSTETRICS & GYNECOLOGY
Payer: COMMERCIAL

## 2017-03-07 PROCEDURE — 76817 TRANSVAGINAL US OBSTETRIC: CPT | Performed by: OBSTETRICS & GYNECOLOGY

## 2017-03-08 LAB
CELLS ANALYZED AMN: 15
CELLS COUNTED AMN: 15
CELLS KARYOTYPED.TOTAL AMN: 2
CLINICAL CYTOGENETICIST SPEC: NORMAL
COLONIES COUNTED AMN: 15
ISCN BAND LEVEL AMN QL: 450
KARYOTYP AMN: NORMAL
KARYOTYP AMN: NORMAL
SPECIMEN SOURCE: NORMAL

## 2017-03-28 ENCOUNTER — HOSPITAL ENCOUNTER (OUTPATIENT)
Dept: PERINATAL CARE | Age: 42
Discharge: HOME | End: 2017-03-28
Payer: COMMERCIAL

## 2017-03-28 PROCEDURE — 76817 TRANSVAGINAL US OBSTETRIC: CPT | Performed by: OBSTETRICS & GYNECOLOGY

## 2017-03-28 PROCEDURE — 76816 OB US FOLLOW-UP PER FETUS: CPT | Performed by: OBSTETRICS & GYNECOLOGY

## 2017-04-25 ENCOUNTER — HOSPITAL ENCOUNTER (OUTPATIENT)
Dept: PERINATAL CARE | Age: 42
Discharge: HOME | End: 2017-04-25
Attending: OBSTETRICS & GYNECOLOGY
Payer: COMMERCIAL

## 2017-04-25 PROCEDURE — 76816 OB US FOLLOW-UP PER FETUS: CPT | Performed by: OBSTETRICS & GYNECOLOGY

## 2017-04-27 ENCOUNTER — HOSPITAL ENCOUNTER (INPATIENT)
Age: 42
LOS: 6 days | Discharge: HOME | End: 2017-05-03
Attending: OBSTETRICS & GYNECOLOGY | Admitting: OBSTETRICS & GYNECOLOGY
Payer: COMMERCIAL

## 2017-04-27 PROBLEM — O60.00 PRETERM LABOR: Status: ACTIVE | Noted: 2017-04-27

## 2017-04-27 LAB
APPEARANCE UR: CLEAR
BACTERIA URNS QL MICRO: NEGATIVE /HPF
BILIRUB UR QL: NEGATIVE
COLOR UR: YELLOW
EPITH CASTS URNS QL MICRO: ABNORMAL /LPF
ERYTHROCYTE [DISTWIDTH] IN BLOOD BY AUTOMATED COUNT: 15.9 % (ref 11.5–14.5)
GLUCOSE UR STRIP.AUTO-MCNC: NEGATIVE MG/DL
HCT VFR BLD AUTO: 29.3 % (ref 35–47)
HGB BLD-MCNC: 9.4 G/DL (ref 11.5–16)
HGB UR QL STRIP: ABNORMAL
KETONES UR QL STRIP.AUTO: NEGATIVE MG/DL
LEUKOCYTE ESTERASE UR QL STRIP.AUTO: NEGATIVE
MCH RBC QN AUTO: 25.4 PG (ref 26–34)
MCHC RBC AUTO-ENTMCNC: 32.1 G/DL (ref 30–36.5)
MCV RBC AUTO: 79.2 FL (ref 80–99)
NITRITE UR QL STRIP.AUTO: NEGATIVE
PH UR STRIP: 6 [PH] (ref 5–8)
PLATELET # BLD AUTO: 129 K/UL (ref 150–400)
PROT UR STRIP-MCNC: NEGATIVE MG/DL
RBC # BLD AUTO: 3.7 M/UL (ref 3.8–5.2)
RBC #/AREA URNS HPF: ABNORMAL /HPF (ref 0–5)
SP GR UR REFRACTOMETRY: 1 (ref 1–1.03)
UA: UC IF INDICATED,UAUC: ABNORMAL
UROBILINOGEN UR QL STRIP.AUTO: 0.2 EU/DL (ref 0.2–1)
WBC # BLD AUTO: 8.8 K/UL (ref 3.6–11)
WBC URNS QL MICRO: ABNORMAL /HPF (ref 0–4)

## 2017-04-27 PROCEDURE — 74011250636 HC RX REV CODE- 250/636

## 2017-04-27 PROCEDURE — 75410000002 HC LABOR FEE PER 1 HR

## 2017-04-27 PROCEDURE — 74011250636 HC RX REV CODE- 250/636: Performed by: OBSTETRICS & GYNECOLOGY

## 2017-04-27 PROCEDURE — 74011250637 HC RX REV CODE- 250/637: Performed by: OBSTETRICS & GYNECOLOGY

## 2017-04-27 PROCEDURE — 74011000258 HC RX REV CODE- 258: Performed by: OBSTETRICS & GYNECOLOGY

## 2017-04-27 PROCEDURE — 86644 CMV ANTIBODY: CPT | Performed by: OBSTETRICS & GYNECOLOGY

## 2017-04-27 PROCEDURE — 36415 COLL VENOUS BLD VENIPUNCTURE: CPT | Performed by: OBSTETRICS & GYNECOLOGY

## 2017-04-27 PROCEDURE — 85027 COMPLETE CBC AUTOMATED: CPT | Performed by: OBSTETRICS & GYNECOLOGY

## 2017-04-27 PROCEDURE — 65270000029 HC RM PRIVATE

## 2017-04-27 PROCEDURE — 86900 BLOOD TYPING SEROLOGIC ABO: CPT | Performed by: OBSTETRICS & GYNECOLOGY

## 2017-04-27 PROCEDURE — 81001 URINALYSIS AUTO W/SCOPE: CPT | Performed by: OBSTETRICS & GYNECOLOGY

## 2017-04-27 PROCEDURE — 86923 COMPATIBILITY TEST ELECTRIC: CPT | Performed by: OBSTETRICS & GYNECOLOGY

## 2017-04-27 PROCEDURE — 99283 EMERGENCY DEPT VISIT LOW MDM: CPT

## 2017-04-27 RX ORDER — MAG HYDROX/ALUMINUM HYD/SIMETH 200-200-20
30 SUSPENSION, ORAL (FINAL DOSE FORM) ORAL
Status: DISCONTINUED | OUTPATIENT
Start: 2017-04-27 | End: 2017-04-29 | Stop reason: HOSPADM

## 2017-04-27 RX ORDER — ONDANSETRON 4 MG/1
4 TABLET, ORALLY DISINTEGRATING ORAL
Status: DISCONTINUED | OUTPATIENT
Start: 2017-04-27 | End: 2017-04-29 | Stop reason: HOSPADM

## 2017-04-27 RX ORDER — NALOXONE HYDROCHLORIDE 0.4 MG/ML
0.4 INJECTION, SOLUTION INTRAMUSCULAR; INTRAVENOUS; SUBCUTANEOUS AS NEEDED
Status: DISCONTINUED | OUTPATIENT
Start: 2017-04-27 | End: 2017-04-29 | Stop reason: HOSPADM

## 2017-04-27 RX ORDER — BETAMETHASONE SODIUM PHOSPHATE AND BETAMETHASONE ACETATE 3; 3 MG/ML; MG/ML
12 INJECTION, SUSPENSION INTRA-ARTICULAR; INTRALESIONAL; INTRAMUSCULAR; SOFT TISSUE EVERY 24 HOURS
Status: DISCONTINUED | OUTPATIENT
Start: 2017-04-27 | End: 2017-04-29 | Stop reason: ALTCHOICE

## 2017-04-27 RX ORDER — INDOMETHACIN 25 MG/1
50 CAPSULE ORAL ONCE
Status: COMPLETED | OUTPATIENT
Start: 2017-04-27 | End: 2017-04-27

## 2017-04-27 RX ORDER — ACETAMINOPHEN 325 MG/1
650 TABLET ORAL
Status: DISCONTINUED | OUTPATIENT
Start: 2017-04-27 | End: 2017-04-29 | Stop reason: HOSPADM

## 2017-04-27 RX ORDER — SODIUM CHLORIDE 0.9 % (FLUSH) 0.9 %
5-10 SYRINGE (ML) INJECTION AS NEEDED
Status: DISCONTINUED | OUTPATIENT
Start: 2017-04-27 | End: 2017-05-01

## 2017-04-27 RX ORDER — SODIUM CHLORIDE 0.9 % (FLUSH) 0.9 %
5-10 SYRINGE (ML) INJECTION EVERY 8 HOURS
Status: DISCONTINUED | OUTPATIENT
Start: 2017-04-27 | End: 2017-04-27 | Stop reason: SDUPTHER

## 2017-04-27 RX ORDER — VALACYCLOVIR HYDROCHLORIDE 500 MG/1
500 TABLET, FILM COATED ORAL 2 TIMES DAILY
COMMUNITY
End: 2017-05-03

## 2017-04-27 RX ORDER — SODIUM CHLORIDE, SODIUM LACTATE, POTASSIUM CHLORIDE, CALCIUM CHLORIDE 600; 310; 30; 20 MG/100ML; MG/100ML; MG/100ML; MG/100ML
125 INJECTION, SOLUTION INTRAVENOUS CONTINUOUS
Status: DISCONTINUED | OUTPATIENT
Start: 2017-04-27 | End: 2017-04-27 | Stop reason: SDUPTHER

## 2017-04-27 RX ORDER — DOCUSATE SODIUM 100 MG/1
100 CAPSULE, LIQUID FILLED ORAL
Status: DISCONTINUED | OUTPATIENT
Start: 2017-04-27 | End: 2017-04-29 | Stop reason: HOSPADM

## 2017-04-27 RX ORDER — SODIUM CHLORIDE 9 MG/ML
250 INJECTION, SOLUTION INTRAVENOUS AS NEEDED
Status: DISCONTINUED | OUTPATIENT
Start: 2017-04-27 | End: 2017-05-03 | Stop reason: HOSPADM

## 2017-04-27 RX ORDER — MAGNESIUM SULFATE HEPTAHYDRATE 40 MG/ML
2 INJECTION, SOLUTION INTRAVENOUS CONTINUOUS
Status: DISCONTINUED | OUTPATIENT
Start: 2017-04-27 | End: 2017-04-27 | Stop reason: CLARIF

## 2017-04-27 RX ORDER — MAGNESIUM SULFATE HEPTAHYDRATE 40 MG/ML
INJECTION, SOLUTION INTRAVENOUS
Status: COMPLETED
Start: 2017-04-27 | End: 2017-04-27

## 2017-04-27 RX ORDER — INDOMETHACIN 25 MG/1
25 CAPSULE ORAL EVERY 6 HOURS
Status: COMPLETED | OUTPATIENT
Start: 2017-04-28 | End: 2017-04-29

## 2017-04-27 RX ORDER — SODIUM CHLORIDE, SODIUM LACTATE, POTASSIUM CHLORIDE, CALCIUM CHLORIDE 600; 310; 30; 20 MG/100ML; MG/100ML; MG/100ML; MG/100ML
75 INJECTION, SOLUTION INTRAVENOUS CONTINUOUS
Status: DISCONTINUED | OUTPATIENT
Start: 2017-04-27 | End: 2017-04-29

## 2017-04-27 RX ORDER — VALACYCLOVIR HYDROCHLORIDE 500 MG/1
500 TABLET, FILM COATED ORAL EVERY 12 HOURS
Status: DISCONTINUED | OUTPATIENT
Start: 2017-04-27 | End: 2017-05-03 | Stop reason: HOSPADM

## 2017-04-27 RX ORDER — MAGNESIUM SULFATE HEPTAHYDRATE 40 MG/ML
4 INJECTION, SOLUTION INTRAVENOUS ONCE
Status: COMPLETED | OUTPATIENT
Start: 2017-04-27 | End: 2017-04-27

## 2017-04-27 RX ADMIN — AMPICILLIN SODIUM 2 G: 2 INJECTION, POWDER, FOR SOLUTION INTRAMUSCULAR; INTRAVENOUS at 02:12

## 2017-04-27 RX ADMIN — MAGNESIUM SULFATE HEPTAHYDRATE 4 G: 40 INJECTION, SOLUTION INTRAVENOUS at 01:57

## 2017-04-27 RX ADMIN — Medication 2 G/HR: at 07:07

## 2017-04-27 RX ADMIN — INDOMETHACIN 50 MG: 25 CAPSULE ORAL at 20:02

## 2017-04-27 RX ADMIN — Medication 2 G/HR: at 23:49

## 2017-04-27 RX ADMIN — SODIUM CHLORIDE, SODIUM LACTATE, POTASSIUM CHLORIDE, AND CALCIUM CHLORIDE 75 ML/HR: 600; 310; 30; 20 INJECTION, SOLUTION INTRAVENOUS at 09:00

## 2017-04-27 RX ADMIN — SODIUM CHLORIDE, SODIUM LACTATE, POTASSIUM CHLORIDE, AND CALCIUM CHLORIDE 75 ML/HR: 600; 310; 30; 20 INJECTION, SOLUTION INTRAVENOUS at 22:05

## 2017-04-27 RX ADMIN — Medication 2 G/HR: at 02:34

## 2017-04-27 RX ADMIN — VALACYCLOVIR HYDROCHLORIDE 500 MG: 500 TABLET, FILM COATED ORAL at 21:41

## 2017-04-27 RX ADMIN — VALACYCLOVIR HYDROCHLORIDE 500 MG: 500 TABLET, FILM COATED ORAL at 07:07

## 2017-04-27 RX ADMIN — Medication 2 G/HR: at 12:49

## 2017-04-27 RX ADMIN — AMPICILLIN SODIUM 1 G: 1 INJECTION, POWDER, FOR SOLUTION INTRAMUSCULAR; INTRAVENOUS at 13:57

## 2017-04-27 RX ADMIN — SODIUM CHLORIDE, SODIUM LACTATE, POTASSIUM CHLORIDE, AND CALCIUM CHLORIDE 125 ML/HR: 600; 310; 30; 20 INJECTION, SOLUTION INTRAVENOUS at 00:35

## 2017-04-27 RX ADMIN — AMPICILLIN SODIUM 1 G: 1 INJECTION, POWDER, FOR SOLUTION INTRAMUSCULAR; INTRAVENOUS at 08:17

## 2017-04-27 RX ADMIN — AMPICILLIN SODIUM 1 G: 1 INJECTION, POWDER, FOR SOLUTION INTRAMUSCULAR; INTRAVENOUS at 19:59

## 2017-04-27 RX ADMIN — Medication 2 G/HR: at 18:43

## 2017-04-27 RX ADMIN — BETAMETHASONE SODIUM PHOSPHATE AND BETAMETHASONE ACETATE 12 MG: 3; 3 INJECTION, SUSPENSION INTRA-ARTICULAR; INTRALESIONAL; INTRAMUSCULAR at 02:00

## 2017-04-27 NOTE — PROGRESS NOTES
AntePartum Progress Note    Steffi Cancino  21N8Q    Patient admitted for  labor 29w3d Estimated Date of Delivery: 7/10/17 states she does have normal fetal movement and mild ctx and does not  have  vaginal leaking of fluid . Still having some bloody mucus d/c. Ctx decreased to q15 mins now (previously q4)  Vitals:  Patient Vitals for the past 24 hrs:   BP Temp Pulse Resp SpO2 Height Weight   17 0800 114/72 - - - 97 % - -   17 0600 - - - 16 - - -   17 0545 107/67 - 89 - - - -   17 0500 - - - 16 - - -   17 0400 - - - 18 - - -   17 0329 102/64 - 84 - - - -   17 0300 - - - 18 - - -   17 0200 - - - 18 - - -   17 0050 - - - - - 5' (1.524 m) 63.5 kg (140 lb)   17 0019 107/75 98.2 °F (36.8 °C) 95 18 - - -     Temp (24hrs), Av.2 °F (36.8 °C), Min:98.2 °F (36.8 °C), Max:98.2 °F (36.8 °C)    I&O:                    NST:  Non-reactive but appropriate for gestational age  Uterine Activity: ocassional, irergular    Exam:  Patient without distress.                Abdomen soft, non-tender               Fundus soft and non tender               Lower extremities edema No                                           Labs:   Recent Results (from the past 24 hour(s))   CBC W/O DIFF    Collection Time: 17  1:01 AM   Result Value Ref Range    WBC 8.8 3.6 - 11.0 K/uL    RBC 3.70 (L) 3.80 - 5.20 M/uL    HGB 9.4 (L) 11.5 - 16.0 g/dL    HCT 29.3 (L) 35.0 - 47.0 %    MCV 79.2 (L) 80.0 - 99.0 FL    MCH 25.4 (L) 26.0 - 34.0 PG    MCHC 32.1 30.0 - 36.5 g/dL    RDW 15.9 (H) 11.5 - 14.5 %    PLATELET 601 (L) 665 - 400 K/uL   URINALYSIS W/ REFLEX CULTURE    Collection Time: 17  1:01 AM   Result Value Ref Range    Color YELLOW      Appearance CLEAR CLEAR      Specific gravity 1.005 1.003 - 1.030      pH (UA) 6.0 5.0 - 8.0      Protein NEGATIVE  NEG mg/dL    Glucose NEGATIVE  NEG mg/dL    Ketone NEGATIVE  NEG mg/dL    Bilirubin NEGATIVE  NEG      Blood SMALL (A) NEG Urobilinogen 0.2 0.2 - 1.0 EU/dL    Nitrites NEGATIVE  NEG      Leukocyte Esterase NEGATIVE  NEG      WBC 0-4 0 - 4 /hpf    RBC 0-5 0 - 5 /hpf    Epithelial cells FEW FEW /lpf    Bacteria NEGATIVE  NEG /hpf    UA:UC IF INDICATED CULTURE NOT INDICATED BY UA RESULT CNI         Assessment and Plan:   IUP @ 29w3d   Patient Active Problem List    Diagnosis Date Noted     labor 2017     IUP @ 29w3d with incompetent cervix s/p prior rescue cerclage now with  contractions, improved with Mg. S/p Celestone #1 and on Amp for GBS+. Also getting Valtrex for current HSv outbreak. MFM and NICU consults today. Reviewed plan of care.

## 2017-04-27 NOTE — CONSULTS
Maternal-Fetal Medicine    Ms. Marisabel Carlos,  at 29w 3d gestation, was admitted with the diagnosis of  labor. She had uterine contractions, but no leakage of amniotic fluid or vaginal bleeding. Since admission, the patient is receiving magnesium sulfate tocolysis and she also received the first dose of betamethasone. Patient reports good fetal movements. On examination (Dr. Delmy Calvin), recurrent herpetic lesions were seen and Valtrex (500 mg bid) treatment was initiated. Patient is also getting IV ampicillin. Screening in Dec 2016 showed GBS positive. Her prenatal course was complicated by cervical incompetence and cervical dilation. At 21-weeks gestation, she had rescue cerclage procedure. Patient does not have any chronic medical conditions and she does not have gestational diabetes. P/E: Patient is comfortably lying in bed; not in pain. Vitals stable. Abd: Soft gravid uterus; no tenderness. Labs: WBC 8.8, Hb 9.4, Hct 29.3, , urinalysis bacteria neg, WBC 0-4. I counseled the couple on the following (some of the comments related to herpes are for the providers):    I counseled the patient on the following:   labor:  -Explained tocolysis (magnesium sulfate), its benefits (short-term up to 48 hours) and side-effects.  -I also informed her that magnesium sulfate reduces the incidence of moderate to severe cerebral palsy rates if given during labor up to 28 weeks gestation. -Explained the benefits of steroids (lung maturity)  -In-patient management for now. If no further cervical dilation occurs and the uterine contractions cease, patient may be discharged.  -Patient is aware that cerclage will be removed if she goes into active labor.  -She is also aware that if PPROM occurs, she will be staying in the hospital until delivery. Herpes:   Patient has recurrent lesions and is on valacyclovir treatment.  I informed her that if she goes into labor, we would recommend  delivery regardless of presentation. After treatment of primary infection, if recurrent lesions occur, the transmission rate falls to about 3%. Viral shedding can be asymptomatic, but in the absence of lesion,  transmission is extremely rare (2 per 10,000). ACOG does not recommend  delivery in the absence of herpes lesions. Vaginal delivery is recommended in the absence of lesions at the time of delivery.  guidelines recommend considering  delivery if primary infection has occurred in the third trimester 6 weeks before delivery. It is unclear whether they should be an interval period after disappearance of recurrent lesions before considering vaginal delivery. It may be reasonable to wait for 2 to 3 weeks after disappearance of the lesions before considering vaginal delivery. Recommendations:  -Complete steroid course. -Continue magnesium sulfate tocolysis. May discontinue the drug if contractions stop for more than 12 hours or it may be continued for a total of 48 hours. - consultation.  -Valtrex treatment to continue. Prophylactic Valtrex after completion of treatment.

## 2017-04-27 NOTE — PROGRESS NOTES
Received pt via ambulatory to L&D #6, pt reports vaginal bleeding with a rescue cerclage, pt also reports some abdominal tightening intermittantly, pt br to gown,     @0017 pt assisted to bed, placed on monitor;  @0025 Dr. Jenny Senior in, viewed strip, performed pelvic exam, assessed bleeding, cerclage intact;  @0035 IV started, labs drawn;  @0045 urine sample obtained;  @0146 Dr. Jenny Senior performed ultrasound, orders received;  @0155 Magnesium sulfate bolus started;  @0200 Betamethasone given  @0212 Antibiotics started;  @0400 pt trying to rest, reports decrease in length of contractions;

## 2017-04-27 NOTE — PROGRESS NOTES
0700 Bedside shift change report given to MARY LOU Huerta RN (oncoming nurse) by Kiko Strauss RN (offgoing nurse). Report included the following information SBAR, Kardex, MAR, Accordion, Recent Results and Med Rec Status. 0900 Pt requests room change. Moved to Room 1 for decreased [place] traffic and noise. Order received for reg diet from Dr Doron Diaz  9813 Bedside shift change report given to ALVINA Case RN (oncoming nurse) by Kadie Mancera RN (offgoing nurse). Report included the following information SBAR, Kardex, Procedure Summary, Intake/Output, MAR, Accordion, Recent Results and Med Rec Status.

## 2017-04-27 NOTE — PROGRESS NOTES
S: Notified by RN that pt is having bleeding. Had streaks of bright red blood on orion pad and a small clot in the toilet when up to void. CTX on toco have increased but pt denies feeling any ctx at this time especially compared to last night. O:  Visit Vitals    /67    Pulse 84    Temp 97.4 °F (36.3 °C)    Resp 16    Ht 5' (1.524 m)    Wt 63.5 kg (140 lb)    SpO2 97%    Breastfeeding No    BMI 27.34 kg/m2     Gen NAD AAOx3  Abd soft non-tender  Spec exam cerclage present; 25cc bloody mucous discharge without active bleeding present  Cerclage palaptes loose around cervix but is not on tension    Recent Results (from the past 24 hour(s))   CBC W/O DIFF    Collection Time: 04/27/17  1:01 AM   Result Value Ref Range    WBC 8.8 3.6 - 11.0 K/uL    RBC 3.70 (L) 3.80 - 5.20 M/uL    HGB 9.4 (L) 11.5 - 16.0 g/dL    HCT 29.3 (L) 35.0 - 47.0 %    MCV 79.2 (L) 80.0 - 99.0 FL    MCH 25.4 (L) 26.0 - 34.0 PG    MCHC 32.1 30.0 - 36.5 g/dL    RDW 15.9 (H) 11.5 - 14.5 %    PLATELET 845 (L) 776 - 400 K/uL   URINALYSIS W/ REFLEX CULTURE    Collection Time: 04/27/17  1:01 AM   Result Value Ref Range    Color YELLOW      Appearance CLEAR CLEAR      Specific gravity 1.005 1.003 - 1.030      pH (UA) 6.0 5.0 - 8.0      Protein NEGATIVE  NEG mg/dL    Glucose NEGATIVE  NEG mg/dL    Ketone NEGATIVE  NEG mg/dL    Bilirubin NEGATIVE  NEG      Blood SMALL (A) NEG      Urobilinogen 0.2 0.2 - 1.0 EU/dL    Nitrites NEGATIVE  NEG      Leukocyte Esterase NEGATIVE  NEG      WBC 0-4 0 - 4 /hpf    RBC 0-5 0 - 5 /hpf    Epithelial cells FEW FEW /lpf    Bacteria NEGATIVE  NEG /hpf    UA:UC IF INDICATED CULTURE NOT INDICATED BY UA RESULT CNI     TYPE & SCREEN    Collection Time: 04/27/17  1:01 AM   Result Value Ref Range    Crossmatch Expiration 04/30/2017     ABO/Rh(D) A POSITIVE     Antibody screen NEG        A/P:  Discussed case with Dr Fortino Palafox and the pt and her .   Reviewed bleeding may be due to cervical change (has bloody show type appearance) with PTL but cannot definitively rule out possibility for placental abruption. Plan to monitor closely - if bleeding increases will proceed with ceclage removal to avoid cervical laceration. FHT is reassuring. Continue magnesium. For 2nd dose of betamethasone tonight. Continue ampicillin. Will be for CS for delivery due to HSV outbreak. Will T&C 2 units.

## 2017-04-27 NOTE — PROGRESS NOTES
Maternal-Fetal Medicine    Discussed with Dr. Chino Contreras. Patient has some vaginal bleeding with small clots. On SSE (Dr. Chino Contreras), the stitch was intact, but not in tension. She feels well now and has about 6 to 7 mild uterine contractions per hour. Patient is comfortably lying in bed; not in distress. Vitals stable (no tachycardia or hypotension)  Abd: Soft; intermittent mild uterine contractions. NST reassuring for this gestational age. I performed a bedside ultrasound. Cephalic presentation. Amniotic fluid is normal and good fetal activity is seen. Placenta is posterior and there is no evidence of retroplacental clots. No sonographic evidence of placental abruption. I reassured the patient of the findings. I also informed them that ultrasound has limitations in detecting placental abruption. Recommendations:  -Consider increasing magnesium sulfate to 3 grams/hour with frequent magnesium levels. Cerclage may be removed if:  -Contractions increase in frequency with cervical dilation.  -If bleeding is increasing and persistent. I informed the couple that removal of cerclage does not lead to delivery. It is essential to remove cerclage if cervical tear is a possibility. Removal may be considered under epidural analgesia.

## 2017-04-27 NOTE — IP AVS SNAPSHOT
Current Discharge Medication List  
  
START taking these medications Dose & Instructions Dispensing Information Comments Morning Noon Evening Bedtime  
 ibuprofen 800 mg tablet Commonly known as:  MOTRIN Your last dose was: Your next dose is:    
   
   
 Dose:  800 mg Take 1 Tab by mouth every eight (8) hours. Quantity:  40 Tab Refills:  1  
     
   
   
   
  
 oxyCODONE-acetaminophen 7.5-325 mg per tablet Commonly known as:  PERCOCET 7.5 Your last dose was: Your next dose is:    
   
   
 Dose:  1 Tab Take 1 Tab by mouth every four (4) hours as needed. Max Daily Amount: 6 Tabs. Quantity:  20 Tab Refills:  0 CONTINUE these medications which have NOT CHANGED Dose & Instructions Dispensing Information Comments Morning Noon Evening Bedtime  
 prenatal multivit-ca-min-fe-fa Tab Your last dose was: Your next dose is: Take  by mouth. Refills:  0 SLOW  mg (45 mg iron) ER tablet Generic drug:  ferrous sulfate Your last dose was: Your next dose is:    
   
   
 Dose:  142 mg Take 142 mg by mouth Daily (before breakfast). Refills:  0 STOP taking these medications VALTREX 500 mg tablet Generic drug:  valACYclovir Where to Get Your Medications Information on where to get these meds will be given to you by the nurse or doctor. ! Ask your nurse or doctor about these medications  
  ibuprofen 800 mg tablet  
 oxyCODONE-acetaminophen 7.5-325 mg per tablet

## 2017-04-27 NOTE — PROGRESS NOTES
1552 Bedside shift change report given to CHEMO Case RN  (oncoming nurse) by LA Sanchez (offgoing nurse). Report included the following information SBAR, Kardex, Intake/Output, MAR and Recent Results. 550 Russ Villeda at bedside. Speculum exam completed. SVE closed, blood noted. 685 Old Dear Jesus Izquierdo at bedside. If contractions get stronger or more bleeding, we will need to remove the stitch. [address] [facility] [address] Marmolejo from NICU at bedside to discuss plan. 1950 Bedside shift change report given to CINTHYA Mendoza RN (oncoming nurse) by CHEMO Shaffer RN (offgoing nurse). Report included the following information SBAR, Kardex, Intake/Output, MAR and Recent Results.

## 2017-04-27 NOTE — H&P
History & Physical    Name: Laci Scruggs MRN: [id number]  SSN: [id number]    YOB: 1975  Age: 46 y.o. Sex: female      Subjective:     Reason for Admission:  Pregnancy and  labor    History of Present Illness: Ms. Olayinka Balderas is a 46 y.o. female with an estimated gestational age of 28w2d with Estimated Date of Delivery: 7/10/17. Patient complains of contractions that began around 630 pm last night and have now worsened. Also reports bright red blood and mucous on pantiliner. No LOF. Normal FM. Pregnancy has been complicated by incompetent cervix - received a rescue cerclage by Dr Aquiles Pérez at 21 weeks. Patient denies fever but has had recent cold and increased coughing. Reports active HSV infection and took diflucan yesterday for a yeast infection. OB History    Para Term  AB SAB TAB Ectopic Multiple Living   2    1 1          # Outcome Date GA Lbr Alex/2nd Weight Sex Delivery Anes PTL Lv   2 Current            1 SAB 16 9w0d               Past Medical History:   Diagnosis Date    Genital herpes     Infertility, female     IVF     Past Surgical History:   Procedure Laterality Date    HX DILATION AND CURETTAGE  2016    HX HYSTEROSCOPY  2016    HX removal of left tube  2016         Social History     Occupational History    Not on file. Social History Main Topics    Smoking status: Never Smoker    Smokeless tobacco: Not on file    Alcohol use No    Drug use: No    Sexual activity: Yes     Partners: Male      Family History   Problem Relation Age of Onset    Diabetes Father        No Known Allergies  Prior to Admission medications    Medication Sig Start Date End Date Taking? Authorizing Provider   valACYclovir (VALTREX) 500 mg tablet Take 500 mg by mouth two (2) times a day. Yes Historical Provider   ferrous sulfate (SLOW FE) 142 mg (45 mg iron) ER tablet Take 142 mg by mouth Daily (before breakfast).    Yes Historical Provider   prenatal multivit-ca-min-fe-fa tab Take  by mouth. Yes Historical Provider        Review of Systems:  A comprehensive review of systems was negative except for that written in the History of Present Illness. Objective:     Vitals:    Vitals:    04/27/17 0050   Weight: 63.5 kg (140 lb)   Height: 5' (1.524 m)      107/75, HR 95    Physical Exam:  Patient without distress. Heart: Regular rate and rhythm  Lung: clear to auscultation throughout lung fields, no wheezes, no rales, no rhonchi and normal respiratory effort  Abdomen: soft, nontender  Fundus: soft and non tender  Perineum: blood absent, amniotic fluid absent  Speculum exam: 15cc blood tinged mucous in the vault; no active bleeding. Cerclage present. On vaginal palpation limited cervix palpable but stitch not on tension.   Lower Extremities:  - Edema No     Membranes:  Intact  Uterine Activity:  CTX every 4 minutes  Fetal Heart Rate:  150s mod 10+10 accels; occasional early decels       Bedside US: vertex; visible FM; normal appearing fluid    Lab/Data Review:  Recent Results (from the past 24 hour(s))   CBC W/O DIFF    Collection Time: 04/27/17  1:01 AM   Result Value Ref Range    WBC 8.8 3.6 - 11.0 K/uL    RBC 3.70 (L) 3.80 - 5.20 M/uL    HGB 9.4 (L) 11.5 - 16.0 g/dL    HCT 29.3 (L) 35.0 - 47.0 %    MCV 79.2 (L) 80.0 - 99.0 FL    MCH 25.4 (L) 26.0 - 34.0 PG    MCHC 32.1 30.0 - 36.5 g/dL    RDW 15.9 (H) 11.5 - 14.5 %    PLATELET 681 (L) 712 - 400 K/uL   URINALYSIS W/ REFLEX CULTURE    Collection Time: 04/27/17  1:01 AM   Result Value Ref Range    Color YELLOW      Appearance CLEAR CLEAR      Specific gravity 1.005 1.003 - 1.030      pH (UA) 6.0 5.0 - 8.0      Protein NEGATIVE  NEG mg/dL    Glucose NEGATIVE  NEG mg/dL    Ketone NEGATIVE  NEG mg/dL    Bilirubin NEGATIVE  NEG      Blood SMALL (A) NEG      Urobilinogen 0.2 0.2 - 1.0 EU/dL    Nitrites NEGATIVE  NEG      Leukocyte Esterase NEGATIVE  NEG      WBC PENDING /hpf    RBC PENDING /hpf    Epithelial cells PENDING /lpf    Bacteria PENDING /hpf    UA:UC IF INDICATED PENDING      Lab Results   Component Value Date/Time    Rubella, External immune 2016    GrBStrep, External positive 2016    HBsAg, External negative 2016    HIV, External negative 2016       Assessment and Plan: Active Problems:     labor (2017)       46yo  at 29w3d with  labor. No discernable cervical change with exam and stitch in place however patient now with painful regular contractions which haven't resolved to IVF bolus. Plan for magnesium tocolysis, betamethasone, ampicillin for GBS prevention. Discussed with pt that if labored required delivering would need a CS at this time due to active HSV outbreak. Continue valtrex. DVT prophylaxis. Overall FHT reassuring. Plan to discuss with MFM in the morning. NICU consult.     Signed By:  Juan Antonio Franklin MD     2017

## 2017-04-27 NOTE — IP AVS SNAPSHOT
[address] 
[phone] Patient: Carlos Cox MRN: [id number] BNT: You are allergic to the following No active allergies Immunizations Administered for This Admission Name Date Tdap 5/3/2017 Recent Documentation Height Weight Breastfeeding? BMI OB Status Smoking Status 1.524 m 63.5 kg Unknown 27.34 kg/m2 Recent pregnancy Never Smoker Unresulted Labs Order Current Status SAMPLE TO BLOOD BANK In process Emergency Contacts Name Discharge Info Relation Home Work Mobile Nabor Barton  Spouse [3] [phone] About your hospitalization You were admitted on:  2017 You last received care in the:  [facility] 3E ANTEPARTUM/MI You were discharged on:  May 3, 2017 Unit phone number:  [phone] Why you were hospitalized Your primary diagnosis was:  Not on File Your diagnoses also included:   Labor Providers Seen During Your Hospitalizations Provider Role Specialty Primary office phone Bridget Stringer MD Attending Provider Obstetrics & Gynecology [phone] Your Primary Care Physician (PCP) Primary Care Physician Office Phone Office Fax Henriquebecyk Toney [phone] Follow-up Information Follow up With Details Comments Contact Info Bridget Stringer MD   [address] [facility] [address] [facility] 
[phone] Bridget Stringer MD In 2 weeks  [address] [facility] [address] [facility] 
[phone] Current Discharge Medication List  
  
START taking these medications Dose & Instructions Dispensing Information Comments Morning Noon Evening Bedtime  
 ibuprofen 800 mg tablet Commonly known as:  MOTRIN Your last dose was: Your next dose is:    
   
   
 Dose:  800 mg Take 1 Tab by mouth every eight (8) hours. Quantity:  40 Tab Refills:  1  
     
   
   
   
  
 oxyCODONE-acetaminophen 7.5-325 mg per tablet Commonly known as:  PERCOCET 7.5 Your last dose was: Your next dose is:    
   
   
 Dose:  1 Tab Take 1 Tab by mouth every four (4) hours as needed. Max Daily Amount: 6 Tabs. Quantity:  20 Tab Refills:  0 CONTINUE these medications which have NOT CHANGED Dose & Instructions Dispensing Information Comments Morning Noon Evening Bedtime  
 prenatal multivit-ca-min-fe-fa Tab Your last dose was: Your next dose is: Take  by mouth. Refills:  0 SLOW  mg (45 mg iron) ER tablet Generic drug:  ferrous sulfate Your last dose was: Your next dose is:    
   
   
 Dose:  142 mg Take 142 mg by mouth Daily (before breakfast). Refills:  0 STOP taking these medications VALTREX 500 mg tablet Generic drug:  valACYclovir Where to Get Your Medications Information on where to get these meds will be given to you by the nurse or doctor. ! Ask your nurse or doctor about these medications  
  ibuprofen 800 mg tablet  
 oxyCODONE-acetaminophen 7.5-325 mg per tablet Discharge Instructions  Section: What to Expect at [facility] Your Recovery A  section, or , is surgery to deliver your baby through a cut, called an incision, that the doctor makes in your lower belly and uterus. You may have some pain in your lower belly and need pain medicine for 1 to 2 weeks. You can expect some vaginal bleeding for several weeks. You will probably need about 6 weeks to fully recover. It is important to take it easy while the incision is healing. Avoid heavy lifting, strenuous activities, or exercises that strain the belly muscles while you are recovering. Ask a family member or friend for help with housework, cooking, and shopping. This care sheet gives you a general idea about how long it will take for you to recover. But each person recovers at a different pace. Follow the steps below to get better as quickly as possible. How can you care for yourself at home? Activity · Rest when you feel tired. Getting enough sleep will help you recover. · Try to walk each day. Start by walking a little more than you did the day before. Bit by bit, increase the amount you walk. Walking boosts blood flow and helps prevent pneumonia, constipation, and blood clots. · Avoid strenuous activities, such as bicycle riding, jogging, weightlifting, and aerobic exercise, for 6 weeks or until your doctor says it is okay. · Until your doctor says it is okay, do not lift anything heavier than your baby. · Do not do sit-ups or other exercises that strain the belly muscles for 6 weeks or until your doctor says it is okay. · Hold a pillow over your incision when you cough or take deep breaths. This will support your belly and decrease your pain. · You may shower as usual. Pat the incision dry when you are done. · You will have some vaginal bleeding. Wear sanitary pads. Do not douche or use tampons until your doctor says it is okay. · Ask your doctor when you can drive again. · You will probably need to take at least 6 weeks off work. It depends on the type of work you do and how you feel. · Ask your doctor when it is okay for you to have sex. Diet · You can eat your normal diet. If your stomach is upset, try bland, low-fat foods like plain rice, broiled chicken, toast, and yogurt. · Drink plenty of fluids (unless your doctor tells you not to). · You may notice that your bowel movements are not regular right after your surgery. This is common. Try to avoid constipation and straining with bowel movements. You may want to take a fiber supplement every day.  If you have not had a bowel movement after a couple of days, ask your doctor about taking a mild laxative. · If you are breastfeeding, do not drink any alcohol. Medicines · Your doctor will tell you if and when you can restart your medicines. He or she will also give you instructions about taking any new medicines. · If you take blood thinners, such as warfarin (Coumadin), clopidogrel (Plavix), or aspirin, be sure to talk to your doctor. He or she will tell you if and when to start taking those medicines again. Make sure that you understand exactly what your doctor wants you to do. · Take pain medicines exactly as directed. ¨ If the doctor gave you a prescription medicine for pain, take it as prescribed. ¨ If you are not taking a prescription pain medicine, ask your doctor if you can take an over-the-counter medicine. · If you think your pain medicine is making you sick to your stomach: 
¨ Take your medicine after meals (unless your doctor has told you not to). ¨ Ask your doctor for a different pain medicine. · If your doctor prescribed antibiotics, take them as directed. Do not stop taking them just because you feel better. You need to take the full course of antibiotics. Incision care · If you have strips of tape on the incision, leave the tape on for a week or until it falls off. · Wash the area daily with warm, soapy water, and pat it dry. Don't use hydrogen peroxide or alcohol, which can slow healing. You may cover the area with a gauze bandage if it weeps or rubs against clothing. Change the bandage every day. · Keep the area clean and dry. Other instructions · If you breastfeed your baby, you may be more comfortable while you are healing if you place the baby so that he or she is not resting on your belly. Try tucking your baby under your arm, with his or her body along the side you will be feeding on. Support your baby's upper body with your arm.  With that hand you can control your baby's head to bring his or her mouth to your breast. This is sometimes called the football hold. Follow-up care is a key part of your treatment and safety. Be sure to make and go to all appointments, and call your doctor if you are having problems. It's also a good idea to know your test results and keep a list of the medicines you take. When should you call for help? Call 911 anytime you think you may need emergency care. For example, call if: 
· You passed out (lost consciousness). · You have symptoms of a blood clot in your lung (called a pulmonary embolism). These may include: 
¨ Sudden chest pain. ¨ Trouble breathing. ¨ Coughing up blood. · You have thoughts of harming yourself, your baby, or another person. Call your doctor now or seek immediate medical care if: 
· You have severe vaginal bleeding. This means that you are soaking through a pad every hour for 2 or more hours. · You are dizzy or lightheaded, or you feel like you may faint. · You have new or more belly pain. · You have loose stitches, or your incision comes open. · You have symptoms of infection, such as: 
¨ Increased pain, swelling, warmth, or redness. ¨ Red streaks leading from the incision. ¨ Pus draining from the incision. ¨ A fever. · You have symptoms of a blood clot in your leg (called a deep vein thrombosis), such as: 
¨ Pain in your calf, back of the knee, thigh, or groin. ¨ Redness and swelling in your leg or groin. Watch closely for changes in your health, and be sure to contact your doctor if: 
· You feel sad, anxious, or hopeless for more than a few days. · You do not get better as expected. Where can you learn more? Go to http://kris-dominguez.info/. Enter M806 in the search box to learn more about \" Section: What to Expect at Home. \" Current as of: May 30, 2016 Content Version: 11.2 © 0581-9681 [employer].  Care instructions adapted under license by Khurram David (which disclaims liability or warranty for this information). If you have questions about a medical condition or this instruction, always ask your healthcare professional. [address] any warranty or liability for your use of this information. Patient Discharge Instructions Moncho Benavidez / [id number] : 1975 Admitted 2017 Discharged: 5/3/2017 Personal Items Please collect from your nurse all clothing which belongs to you. Please collect from the  any valuables you stored during your stay, and please remember all of your personal items, such as dentures, canes, and eyeglasses. Activity Instructions You must avoid lifting more than the weight of your baby in pounds until your physician instructs you differently. You should avoid driving and sexual activity. You may resume driving and sexual activity. I understand that if any problems occur once I am at home I am to contact my physician. I understand and acknowledge receipt of the instructions indicated above. Discharge Orders None [employer] Announcement We are excited to announce that we are making your provider's discharge notes available to you in [employer]. You will see these notes when they are completed and signed by the physician that discharged you from your recent hospital stay. If you have any questions or concerns about any information you see in [facility], please call the Health Information Department where you were seen or reach out to your Primary Care Provider for more information about your plan of care. Introducing [facility]! Dear Sukhdeep Moctezuma: Thank you for requesting a [employer] account. Our records indicate that you already have an active [employer] account. You can access your account anytime at [email or web address] Did you know that you can access your hospital and ER discharge instructions at any time in MyChart? You can also review all of your test results from your hospital stay or ER visit. Additional Information If you have questions, please visit the Frequently Asked Questions section of the [email or web address] website at [email or web address] Remember, MyChart is NOT to be used for urgent needs. For medical emergencies, dial 911. Now available from your iPhone and Android! General Information Please provide this summary of care documentation to your next provider. Patient Signature:  ____________________________________________________________ Date:  ____________________________________________________________  
  
Marino Lechuga Provider Signature:  ____________________________________________________________ Date:  ____________________________________________________________

## 2017-04-27 NOTE — CONSULTS
Neonatology Antepartum Consultation    Name: Chico Mohr      Medical Record Number: [id number]      YOB: 1975     Today's Date: 2017                                                                 Date of Consultation:  2017  Time: 7:42 PM  Attending MD: Dr. Yves Olea  Referring Physician: Dr. Seda Ibarra  Reason for Consultation:  29 week gestation pregnancy s/p cerclage with PTL and bleeding    Subjective:     Age: 46 y.o.  Jeniffer Bolivar:   Gestation age: 28w2d      Maternal steroids: yes (1st dose on 17)     Objective:     Medications:   Current Facility-Administered Medications   Medication Dose Route Frequency    lactated ringers infusion  125 mL/hr IntraVENous CONTINUOUS    betamethasone (CELESTONE) injection 12 mg  12 mg IntraMUSCular Q24H    ampicillin (OMNIPEN) 1 g in 0.9% sodium chloride (MBP/ADV) 50 mL  1 g IntraVENous Q6H    lactated ringers bolus infusion 500 mL  500 mL IntraVENous PRN    sodium chloride (NS) flush 5-10 mL  5-10 mL IntraVENous PRN    naloxone (NARCAN) injection 0.4 mg  0.4 mg IntraVENous PRN    alum-mag hydroxide-simeth (MYLANTA) oral suspension 30 mL  30 mL Oral Q4H PRN    ondansetron (ZOFRAN ODT) tablet 4 mg  4 mg Oral Q4H PRN    acetaminophen (TYLENOL) tablet 650 mg  650 mg Oral Q4H PRN    docusate sodium (COLACE) capsule 100 mg  100 mg Oral DAILY PRN    valACYclovir (VALTREX) tablet 500 mg  500 mg Oral Q12H    magnesium sulfate 10 gm/250 mL D5W infusion  2 g/hr IntraVENous CONTINUOUS    0.9% sodium chloride infusion 250 mL  250 mL IntraVENous PRN    indomethacin (INDOCIN) capsule 50 mg  50 mg Oral ONCE    [START ON 2017] indomethacin (INDOCIN) capsule 25 mg  25 mg Oral Q6H     Rupture of Membrane:   INTACT  Meconium Stained:   n/a    Data Review:  Lab:   Lab Results   Component Value Date/Time    ABO/Rh(D) A POSITIVE 2017 01:01 AM    Antibody screen NEG 2017 01:01 AM    Rubella, External immune 2016    GrBStrep, External positive 2016    HBsAg, External negative 2016    HIV, External negative 2016    ABO,Rh A Positive 2016    Antibody screen, External negative 2016     Assessment: 29 week gestation pregnancy with PTL (improved on magnesium sulfate). Vaginal bleeding (ultrasound did not show signs of abruption per Dr. Emiliano Lopez). Herpetic lesions - currently on Valtrex. GBS + on Ampicillin. Hx of cervical incompetence with a cerclage put in place at 21 weeks gestation and remains in place to date. Active Problems:     labor (2017)        OB Concerns/Plan:     Approximate survival statistics in overall population at this Gestation Age 29w3d  >90%  [x]   Explained limitation of statistics to parents    Common problems at this Gestation Age: 28w2d  I met with this couple in LND room 1. We discussed at length what to expect if their baby BOY (still working on the name) was to be born in the next day or 2. We discussed the risk for neurological and behavioral issues including the increased risk for IVH and need for a Head ultrasound after birth. We also discussed the following: ROP, lung disease (possible need for Surfactant, mechanical ventilation, and CPAP), CHD (possible need for treatment and/or surgery), Infection (possible need for antibiotics), Feeding problems (need for IV nutrition and slow initiation of breast milk feeds -- importance of breast milk discussed). We also discussed what to expect at delivery (including the team), possible need for central catheters to be placed,  Length of stay, and overall NICU support and experience. Ample time was given to this mom and dad for questions. It was a pleasure to meet with this sweet couple. If we can help further please do not hesitate to call. However, not limited to the above.      [x]    Explained NICU coverage and team approach  [x]    Answered question  []    Offered tour  []    Obtained consents  [x]    Discussed Benefits of Breast Feeding  []    Discussed the Parent Progress note      Signed:  Jose Diallo  Date: 4/27/2017  Time: 2000

## 2017-04-28 PROCEDURE — 74011250637 HC RX REV CODE- 250/637: Performed by: OBSTETRICS & GYNECOLOGY

## 2017-04-28 PROCEDURE — 74011000258 HC RX REV CODE- 258: Performed by: OBSTETRICS & GYNECOLOGY

## 2017-04-28 PROCEDURE — 74011250636 HC RX REV CODE- 250/636: Performed by: OBSTETRICS & GYNECOLOGY

## 2017-04-28 PROCEDURE — 65270000029 HC RM PRIVATE

## 2017-04-28 PROCEDURE — 75410000002 HC LABOR FEE PER 1 HR

## 2017-04-28 RX ADMIN — DOCUSATE SODIUM 100 MG: 100 CAPSULE, LIQUID FILLED ORAL at 08:45

## 2017-04-28 RX ADMIN — SODIUM CHLORIDE, SODIUM LACTATE, POTASSIUM CHLORIDE, AND CALCIUM CHLORIDE 125 ML/HR: 600; 310; 30; 20 INJECTION, SOLUTION INTRAVENOUS at 12:02

## 2017-04-28 RX ADMIN — Medication 2 G/HR: at 11:17

## 2017-04-28 RX ADMIN — INDOMETHACIN 25 MG: 25 CAPSULE ORAL at 02:09

## 2017-04-28 RX ADMIN — AMPICILLIN SODIUM 1 G: 1 INJECTION, POWDER, FOR SOLUTION INTRAMUSCULAR; INTRAVENOUS at 08:14

## 2017-04-28 RX ADMIN — AMPICILLIN SODIUM 1 G: 1 INJECTION, POWDER, FOR SOLUTION INTRAMUSCULAR; INTRAVENOUS at 02:04

## 2017-04-28 RX ADMIN — VALACYCLOVIR HYDROCHLORIDE 500 MG: 500 TABLET, FILM COATED ORAL at 09:19

## 2017-04-28 RX ADMIN — SALINE NASAL SPRAY 2 SPRAY: 1.5 SOLUTION NASAL at 17:57

## 2017-04-28 RX ADMIN — BETAMETHASONE SODIUM PHOSPHATE AND BETAMETHASONE ACETATE 12 MG: 3; 3 INJECTION, SUSPENSION INTRA-ARTICULAR; INTRALESIONAL; INTRAMUSCULAR at 02:10

## 2017-04-28 RX ADMIN — INDOMETHACIN 25 MG: 25 CAPSULE ORAL at 14:11

## 2017-04-28 RX ADMIN — INDOMETHACIN 25 MG: 25 CAPSULE ORAL at 08:42

## 2017-04-28 RX ADMIN — VALACYCLOVIR HYDROCHLORIDE 500 MG: 500 TABLET, FILM COATED ORAL at 21:23

## 2017-04-28 RX ADMIN — AMPICILLIN SODIUM 1 G: 1 INJECTION, POWDER, FOR SOLUTION INTRAMUSCULAR; INTRAVENOUS at 20:22

## 2017-04-28 RX ADMIN — Medication 2 G/HR: at 21:48

## 2017-04-28 RX ADMIN — AMPICILLIN SODIUM 1 G: 1 INJECTION, POWDER, FOR SOLUTION INTRAMUSCULAR; INTRAVENOUS at 14:12

## 2017-04-28 RX ADMIN — INDOMETHACIN 25 MG: 25 CAPSULE ORAL at 20:08

## 2017-04-28 RX ADMIN — Medication 2 G/HR: at 05:20

## 2017-04-28 RX ADMIN — Medication 2 G/HR: at 16:08

## 2017-04-28 NOTE — PROGRESS NOTES
High Risk Obstetrics Progress Note    Name: Candis Mirza MRN: [id number]  SSN: [id number]    YOB: 1975  Age: 46 y.o. Sex: female      Subjective:      LOS: 1 day    Estimated Date of Delivery: 7/10/17   Gestational Age Today: 32w2d     Patient admitted for  labor. States she does have normal fetal movement and does not have vaginal leaking of fluid . Per nursing, vaginal bleeding decreasing and contractions about 1-2 per hour now. Pt has sleep through the night. Objective:     Vitals:  Blood pressure 95/63, pulse 81, temperature 98.2 °F (36.8 °C), resp. rate 14, height 5' (1.524 m), weight 63.5 kg (140 lb), SpO2 94 %, not currently breastfeeding. Temp (24hrs), Av °F (36.7 °C), Min:97.4 °F (36.3 °C), Max:98.3 °F (60.5 °C)    Systolic (71VQK), BGB:394 , Min:89 , JJL:143      Diastolic (63BTB), IRU:57, Min:51, Max:74       Intake and Output:          Physical Exam:  Patient without distress.  Sleeping comfortably  Abdomen: soft, nontender, gravid       Membranes:  Intact    Uterine Activity:  Frequency: 1-2 times per hour per nursing (difficult to assess when on side)    Fetal Heart Rate:  Reactive  Baseline: 130s per minute  Variability: moderate  Accelerations: yes  Decelerations: none        Labs:   Recent Results (from the past 36 hour(s))   CBC W/O DIFF    Collection Time: 17  1:01 AM   Result Value Ref Range    WBC 8.8 3.6 - 11.0 K/uL    RBC 3.70 (L) 3.80 - 5.20 M/uL    HGB 9.4 (L) 11.5 - 16.0 g/dL    HCT 29.3 (L) 35.0 - 47.0 %    MCV 79.2 (L) 80.0 - 99.0 FL    MCH 25.4 (L) 26.0 - 34.0 PG    MCHC 32.1 30.0 - 36.5 g/dL    RDW 15.9 (H) 11.5 - 14.5 %    PLATELET 378 (L) 121 - 400 K/uL   URINALYSIS W/ REFLEX CULTURE    Collection Time: 17  1:01 AM   Result Value Ref Range    Color YELLOW      Appearance CLEAR CLEAR      Specific gravity 1.005 1.003 - 1.030      pH (UA) 6.0 5.0 - 8.0      Protein NEGATIVE  NEG mg/dL    Glucose NEGATIVE  NEG mg/dL    Ketone NEGATIVE  NEG mg/dL    Bilirubin NEGATIVE  NEG      Blood SMALL (A) NEG      Urobilinogen 0.2 0.2 - 1.0 EU/dL    Nitrites NEGATIVE  NEG      Leukocyte Esterase NEGATIVE  NEG      WBC 0-4 0 - 4 /hpf    RBC 0-5 0 - 5 /hpf    Epithelial cells FEW FEW /lpf    Bacteria NEGATIVE  NEG /hpf    UA:UC IF INDICATED CULTURE NOT INDICATED BY UA RESULT CNI     TYPE & SCREEN    Collection Time: 17  1:01 AM   Result Value Ref Range    Crossmatch Expiration 2017     ABO/Rh(D) A POSITIVE     Antibody screen NEG     Unit number [id number]     Blood component type RC LR AS1     Unit division 00     Status of unit ALLOCATED     Crossmatch result Compatible     Unit number [id number]     Blood component type RC LR AS1     Unit division 00     Status of unit ALLOCATED     Crossmatch result Compatible        Assessment and Plan: Active Problems:     labor (2017)       47 y/o  at 29 4/7 weeks with rescue cerclage placed at 21 weeks admitted for PTL. Contractions rare currently on magnesium (for neuro protection and tocolysis), indocin, Ampicillin. S/p 2nd dose BMZ around 2:30 am. Vaginal bleeding improving. Category I tracing. Active HSV infection-on Valtrex. Vertex but pt will need  for delivery d/t active HSV outbreak. Plan for removal of cerclage if bleeding increases or starts to have painful contractions. S/p NICU and MFM consults.      Signed By: Janny Easley DO     2017

## 2017-04-28 NOTE — PROGRESS NOTES
Bedside and Verbal shift change report given to DANA Barraza RN (oncoming nurse) by ELIZABETH Watson RN (offgoing nurse). Report included the following information SBAR, Kardex, Procedure Summary, Intake/Output, MAR and Recent Results. 2345 Bedside and Verbal shift change report given to ELIZABETH Cooney RN (oncoming nurse) by Guzman Harris RN (offgoing nurse). Report included the following information SBAR, Kardex, Procedure Summary, Intake/Output, MAR and Recent Results.

## 2017-04-28 NOTE — PROGRESS NOTES
1535- Bedside and Verbal shift change report given to ELIZABETH Mcdnoough, RN (oncoming nurse) by LA Camarena (offgoing nurse). Report included the following information SBAR, Intake/Output, MAR and Recent Results. 56- Dr. Kayla Clark at bedside to see pt, strip reviewed, Mag to stay on until dr. Josue Chamberlain comes to see pt tomorrow morning     1853- Bedside and Verbal shift change report given to DANA Boo (oncoming nurse) by ELZIABETH Mcdonough, RN (offgoing nurse). Report included the following information SBAR, Intake/Output, MAR and Recent Results.

## 2017-04-28 NOTE — PROGRESS NOTES
1935 - Bedside and Verbal shift change report given to CINTHYA Boyce RN (oncoming nurse) by CHEMO Case RN (offgoing nurse). Report included the following information SBAR, Procedure Summary, MAR and Recent Results. Gloria Edmonds - Dr. Jose Adames notified of continued vaginal bleedings. Order received to notify Dr. Sarthak Will. Naeem Will notified of continued vaginal bleeding on pads and on toilet paper when wiping. Pt aware of contractions but not in pain. Orders to notify MD if there is an increase in bleeding or contractions become painful. 26 - Dr. Sarthak Will in to see pt. Plan to continue to observe bleeding and contractions. Order to continue magnesium for 24 post second betamethasone. Pt resting comfortably at this time. 26 - Verbal shift change report, due to pt sleeping, given to LA Huerta RN (oncoming nurse) by Rizwan Boyce RN (offgoing nurse). Report included the following information SBAR, Intake/Output, MAR and Recent Results.

## 2017-04-28 NOTE — PROGRESS NOTES
07:: Pt care routine for magnesium neural protaection. Bedside shift change report given to Viral Ramos (oncoming nurse) by Sukhdev Manzo RN (offgoing nurse). Report included the following information SBAR, Kardex, Intake/Output, MAR, Accordion, Recent Results and Med Rec Status.

## 2017-04-29 PROCEDURE — 65410000002 HC RM PRIVATE OB

## 2017-04-29 PROCEDURE — 74011250637 HC RX REV CODE- 250/637: Performed by: OBSTETRICS & GYNECOLOGY

## 2017-04-29 PROCEDURE — 75410000002 HC LABOR FEE PER 1 HR

## 2017-04-29 PROCEDURE — 74011250636 HC RX REV CODE- 250/636: Performed by: OBSTETRICS & GYNECOLOGY

## 2017-04-29 PROCEDURE — 74011000258 HC RX REV CODE- 258: Performed by: OBSTETRICS & GYNECOLOGY

## 2017-04-29 PROCEDURE — 77030027138 HC INCENT SPIROMETER -A

## 2017-04-29 RX ORDER — MAG HYDROX/ALUMINUM HYD/SIMETH 200-200-20
30 SUSPENSION, ORAL (FINAL DOSE FORM) ORAL
Status: DISCONTINUED | OUTPATIENT
Start: 2017-04-29 | End: 2017-05-03 | Stop reason: HOSPADM

## 2017-04-29 RX ADMIN — INDOMETHACIN 25 MG: 25 CAPSULE ORAL at 02:16

## 2017-04-29 RX ADMIN — INDOMETHACIN 25 MG: 25 CAPSULE ORAL at 07:36

## 2017-04-29 RX ADMIN — AMPICILLIN SODIUM 1 G: 1 INJECTION, POWDER, FOR SOLUTION INTRAMUSCULAR; INTRAVENOUS at 02:17

## 2017-04-29 RX ADMIN — VALACYCLOVIR HYDROCHLORIDE 500 MG: 500 TABLET, FILM COATED ORAL at 20:15

## 2017-04-29 RX ADMIN — VALACYCLOVIR HYDROCHLORIDE 500 MG: 500 TABLET, FILM COATED ORAL at 09:12

## 2017-04-29 RX ADMIN — INDOMETHACIN 25 MG: 25 CAPSULE ORAL at 20:11

## 2017-04-29 RX ADMIN — ALUMINUM HYDROXIDE, MAGNESIUM HYDROXIDE, AND SIMETHICONE 30 ML: 200; 200; 20 SUSPENSION ORAL at 14:09

## 2017-04-29 RX ADMIN — DOCUSATE SODIUM 100 MG: 100 CAPSULE, LIQUID FILLED ORAL at 07:36

## 2017-04-29 RX ADMIN — Medication 2 G/HR: at 02:48

## 2017-04-29 RX ADMIN — SODIUM CHLORIDE, SODIUM LACTATE, POTASSIUM CHLORIDE, AND CALCIUM CHLORIDE 75 ML/HR: 600; 310; 30; 20 INJECTION, SOLUTION INTRAVENOUS at 02:44

## 2017-04-29 RX ADMIN — ALUMINUM HYDROXIDE, MAGNESIUM HYDROXIDE, AND SIMETHICONE 30 ML: 200; 200; 20 SUSPENSION ORAL at 07:34

## 2017-04-29 RX ADMIN — INDOMETHACIN 25 MG: 25 CAPSULE ORAL at 14:15

## 2017-04-29 NOTE — PROGRESS NOTES
2345: Bedside and Verbal shift change report given to RADHA Cooney RN (oncoming nurse) by Skyler Christopher RN (offgoing nurse). Report included the following information SBAR, Intake/Output, MAR and Recent Results. Pt states she wants to sleep until her meds are due until 0200 and wishes to not be disturbed until then. 8760: Dr. Gilbert Hernandez at bedside discussing plan of care, magnesium turned off,plan to monitor on L&D for 2 hrs for labor and hopefully transfer to antepartum, discussed need for c/s if in labor due to active HSV 2 outbreak    0735: Bedside shift change report given to ALVINA Stuart RN (oncoming nurse) by Ric Guillen RN (offgoing nurse). Report included the following information SBAR, Intake/Output, MAR and Recent Results.

## 2017-04-29 NOTE — PROGRESS NOTES
TRANSFER - IN REPORT:    Verbal report received from CHEMO Alejandro RN on Jewel Alevism  being received from L&D(unit) for routine progression of care      Report consisted of patients Situation, Background, Assessment and   Recommendations(SBAR). Information from the following report(s) SBAR, Kardex and MAR was reviewed with the receiving nurse. Opportunity for questions and clarification was provided. Assessment completed upon patients arrival to unit and care assumed. 1125 oriented pt to room, routine of patient care on antepartum, and plan of care while here. Settled and  into room. States they havent slept well in a long while and want a room that will be in a quiet area so they may sleep    1230 vs taken pt denies complaints at this time. 1400 Pt given mylanta for heart burn. 1610 Pt sleeping    1810 pt states she had taken several 30 minute naps and feels somewhat better, just has finished dinner.      7278-3231 Hourly rounds made  7875-1997 Hourly rounds made  6267-1573 Hourly rounds made

## 2017-04-29 NOTE — PROGRESS NOTES
0750 Bedside and Verbal shift change report given to Gio Hill  (oncoming nurse) by Ivet GUSMAN  (offgoing nurse). Report included the following information SBAR, Kardex, Procedure Summary, Intake/Output, MAR and Recent Results. 1010 Dr. Marquise Epperson notified that patient has not had any contractions since this morning. Informed of one fetal deceleration lasting approximately 2 minutes. Informed of patient's O2 sats improving since patient has been up moving around. Verbal order to transfer patient to APU. 56 [facility]U called and notified of patient's transfer order. 1105 TRANSFER - OUT REPORT:    Verbal report given to Cristian Roach (name) on Autumn Nolen  being transferred to APU(unit) for routine progression of care       Report consisted of patients Situation, Background, Assessment and   Recommendations(SBAR). Information from the following report(s) SBAR, Kardex, Procedure Summary, MAR and Recent Results was reviewed with the receiving nurse. Lines:   Peripheral IV 04/27/17 Left Forearm (Active)   Site Assessment Clean, dry, & intact 4/29/2017  7:45 AM   Phlebitis Assessment 0 4/29/2017  7:45 AM   Infiltration Assessment 0 4/29/2017  7:45 AM   Dressing Status Clean, dry, & intact 4/29/2017  7:45 AM   Dressing Type Tape;Transparent 4/29/2017  7:45 AM   Hub Color/Line Status Pink;Capped 4/29/2017  7:45 AM   Alcohol Cap Used Yes 4/29/2017  7:45 AM        Opportunity for questions and clarification was provided.       Patient transported with:   Registered Nurse

## 2017-04-29 NOTE — PROGRESS NOTES
AntePartum Progress Note    Miguelina Allen  [id number]    Patient admitted for  labor 29w5d Estimated Date of Delivery: 7/10/17 states she does have normal fetal movement and has not had any leaking of fluid. Only small tinge of pink when she wipes but no bleeding, has not required a pad. Has not felt any contractions overnight. Sleep was intermittent just due to discomfort and nasal congestion. Vitals:  Patient Vitals for the past 24 hrs:   BP Temp Pulse Resp SpO2   17 0220 100/62 97.5 °F (36.4 °C) 88 16 94 %   17 2301 101/61 97.9 °F (36.6 °C) 85 16 -   17 2102 91/52 - 78 - -   17 1928 104/54 97.9 °F (36.6 °C) 92 14 95 %   17 1706 96/60 - 85 - 95 %   17 1610 94/61 97.7 °F (36.5 °C) 86 12 95 %   17 1400 93/62 98 °F (36.7 °C) 88 14 94 %   17 1200 102/63 97.7 °F (36.5 °C) 77 - -   17 1100 - - - - 97 %   17 1016 98/59 - 85 - -     Temp (24hrs), Av.8 °F (36.6 °C), Min:97.5 °F (36.4 °C), Max:98 °F (36.7 °C)    I&O:    07 - 1900  In: 27.1 [I.V.:27.1]  Out: 400 [Urine:400]             1901 -  0700  In: 2702 [P.O.:1700; I.V.:5090]  Out: 8100 [Urine:8100]  NST:  Reactive  Uterine Activity: None    Exam:  Patient without distress. Abdomen soft, non-tender               Fundus soft and non tender               Lower extremities edema No                                           Labs: No results found for this or any previous visit (from the past 24 hour(s)). Assessment and Plan:   IUP @ 29w5d   Patient Active Problem List    Diagnosis Date Noted     labor 2017     45 y/o  at 34 5/7 weeks with rescue cerclage placed at 21 weeks admitted for PTL. Contractions rare currently on magnesium (for neuro protection and tocolysis), indocin, Ampicillin. BMTZ complete as of  at 02:30. No current vaginal bleeding except tinge of pink when she wipes. Category I tracing. Active HSV infection-on Valtrex. Vertex but pt will need  for delivery d/t active HSV outbreak. Plan for removal of cerclage if bleeding increases or starts to have painful contractions. S/p NICU and MFM consults. Will discontinue magnesium this morning and monitor for contractions. If stable after a couple of hours, will anticipate transfer to the antepartum unit. Continue Indocin for 48 hr since start.

## 2017-04-30 ENCOUNTER — ANESTHESIA (OUTPATIENT)
Dept: LABOR AND DELIVERY | Age: 42
End: 2017-04-30
Payer: COMMERCIAL

## 2017-04-30 ENCOUNTER — ANESTHESIA EVENT (OUTPATIENT)
Dept: LABOR AND DELIVERY | Age: 42
End: 2017-04-30
Payer: COMMERCIAL

## 2017-04-30 PROCEDURE — 74011250636 HC RX REV CODE- 250/636: Performed by: OBSTETRICS & GYNECOLOGY

## 2017-04-30 PROCEDURE — 74011000250 HC RX REV CODE- 250

## 2017-04-30 PROCEDURE — 77030033269 HC SLV COMPR SCD KNE2 CARD -B

## 2017-04-30 PROCEDURE — 74011250636 HC RX REV CODE- 250/636

## 2017-04-30 PROCEDURE — 75410000002 HC LABOR FEE PER 1 HR

## 2017-04-30 PROCEDURE — 77030011220 HC DEV ELECSURG COVD -B

## 2017-04-30 PROCEDURE — 76010000391 HC C SECN FIRST 1 HR: Performed by: OBSTETRICS & GYNECOLOGY

## 2017-04-30 PROCEDURE — 77030007866 HC KT SPN ANES BBMI -B: Performed by: ANESTHESIOLOGY

## 2017-04-30 PROCEDURE — 76060000078 HC EPIDURAL ANESTHESIA: Performed by: OBSTETRICS & GYNECOLOGY

## 2017-04-30 PROCEDURE — 65410000002 HC RM PRIVATE OB

## 2017-04-30 PROCEDURE — 77030011640 HC PAD GRND REM COVD -A

## 2017-04-30 PROCEDURE — 88342 IMHCHEM/IMCYTCHM 1ST ANTB: CPT | Performed by: OBSTETRICS & GYNECOLOGY

## 2017-04-30 PROCEDURE — 77030018846 HC SOL IRR STRL H20 ICUM -A

## 2017-04-30 PROCEDURE — 77030008467 HC STPLR SKN COVD -B

## 2017-04-30 PROCEDURE — 77030018836 HC SOL IRR NACL ICUM -A

## 2017-04-30 PROCEDURE — 88307 TISSUE EXAM BY PATHOLOGIST: CPT | Performed by: OBSTETRICS & GYNECOLOGY

## 2017-04-30 PROCEDURE — 76010000392 HC C SECN EA ADDL 0.5 HR: Performed by: OBSTETRICS & GYNECOLOGY

## 2017-04-30 PROCEDURE — 75410000003 HC RECOV DEL/VAG/CSECN EA 0.5 HR: Performed by: OBSTETRICS & GYNECOLOGY

## 2017-04-30 PROCEDURE — 74011250637 HC RX REV CODE- 250/637: Performed by: OBSTETRICS & GYNECOLOGY

## 2017-04-30 PROCEDURE — 0UCC7ZZ EXTIRPATION OF MATTER FROM CERVIX, VIA NATURAL OR ARTIFICIAL OPENING: ICD-10-PCS | Performed by: OBSTETRICS & GYNECOLOGY

## 2017-04-30 PROCEDURE — 74011250636 HC RX REV CODE- 250/636: Performed by: ANESTHESIOLOGY

## 2017-04-30 RX ORDER — MORPHINE SULFATE 10 MG/ML
10 INJECTION, SOLUTION INTRAMUSCULAR; INTRAVENOUS
Status: DISCONTINUED | OUTPATIENT
Start: 2017-04-30 | End: 2017-05-03 | Stop reason: HOSPADM

## 2017-04-30 RX ORDER — OXYCODONE AND ACETAMINOPHEN 7.5; 325 MG/1; MG/1
1 TABLET ORAL
Status: DISCONTINUED | OUTPATIENT
Start: 2017-04-30 | End: 2017-05-03 | Stop reason: HOSPADM

## 2017-04-30 RX ORDER — OXYTOCIN/RINGER'S LACTATE 20/1000 ML
PLASTIC BAG, INJECTION (ML) INTRAVENOUS
Status: DISCONTINUED | OUTPATIENT
Start: 2017-04-30 | End: 2017-04-30 | Stop reason: HOSPADM

## 2017-04-30 RX ORDER — ONDANSETRON 2 MG/ML
4 INJECTION INTRAMUSCULAR; INTRAVENOUS ONCE
Status: ACTIVE | OUTPATIENT
Start: 2017-04-30 | End: 2017-05-01

## 2017-04-30 RX ORDER — OXYTOCIN/RINGER'S LACTATE 20/1000 ML
PLASTIC BAG, INJECTION (ML) INTRAVENOUS
Status: DISPENSED
Start: 2017-04-30 | End: 2017-05-01

## 2017-04-30 RX ORDER — DIPHENHYDRAMINE HYDROCHLORIDE 50 MG/ML
12.5 INJECTION, SOLUTION INTRAMUSCULAR; INTRAVENOUS
Status: DISCONTINUED | OUTPATIENT
Start: 2017-04-30 | End: 2017-05-03 | Stop reason: HOSPADM

## 2017-04-30 RX ORDER — SODIUM CHLORIDE, SODIUM LACTATE, POTASSIUM CHLORIDE, CALCIUM CHLORIDE 600; 310; 30; 20 MG/100ML; MG/100ML; MG/100ML; MG/100ML
125 INJECTION, SOLUTION INTRAVENOUS CONTINUOUS
Status: DISCONTINUED | OUTPATIENT
Start: 2017-04-30 | End: 2017-05-01

## 2017-04-30 RX ORDER — HYDROMORPHONE HYDROCHLORIDE 1 MG/ML
1 INJECTION, SOLUTION INTRAMUSCULAR; INTRAVENOUS; SUBCUTANEOUS
Status: DISCONTINUED | OUTPATIENT
Start: 2017-04-30 | End: 2017-05-03 | Stop reason: HOSPADM

## 2017-04-30 RX ORDER — KETOROLAC TROMETHAMINE 30 MG/ML
30 INJECTION, SOLUTION INTRAMUSCULAR; INTRAVENOUS
Status: DISPENSED | OUTPATIENT
Start: 2017-04-30 | End: 2017-05-01

## 2017-04-30 RX ORDER — FENTANYL CITRATE 50 UG/ML
INJECTION, SOLUTION INTRAMUSCULAR; INTRAVENOUS AS NEEDED
Status: DISCONTINUED | OUTPATIENT
Start: 2017-04-30 | End: 2017-04-30 | Stop reason: HOSPADM

## 2017-04-30 RX ORDER — CEFAZOLIN SODIUM IN 0.9 % NACL 2 G/50 ML
2 INTRAVENOUS SOLUTION, PIGGYBACK (ML) INTRAVENOUS ONCE
Status: DISCONTINUED | OUTPATIENT
Start: 2017-05-01 | End: 2017-05-01

## 2017-04-30 RX ORDER — CEFAZOLIN SODIUM IN 0.9 % NACL 2 G/50 ML
INTRAVENOUS SOLUTION, PIGGYBACK (ML) INTRAVENOUS
Status: COMPLETED
Start: 2017-04-30 | End: 2017-04-30

## 2017-04-30 RX ORDER — ACETAMINOPHEN 10 MG/ML
1000 INJECTION, SOLUTION INTRAVENOUS
Status: DISCONTINUED | OUTPATIENT
Start: 2017-04-30 | End: 2017-05-03 | Stop reason: HOSPADM

## 2017-04-30 RX ORDER — BUPIVACAINE HYDROCHLORIDE 7.5 MG/ML
INJECTION, SOLUTION EPIDURAL; RETROBULBAR AS NEEDED
Status: DISCONTINUED | OUTPATIENT
Start: 2017-04-30 | End: 2017-04-30 | Stop reason: HOSPADM

## 2017-04-30 RX ORDER — MORPHINE SULFATE 10 MG/ML
6 INJECTION, SOLUTION INTRAMUSCULAR; INTRAVENOUS
Status: DISCONTINUED | OUTPATIENT
Start: 2017-04-30 | End: 2017-05-03 | Stop reason: HOSPADM

## 2017-04-30 RX ORDER — SODIUM CHLORIDE 0.9 % (FLUSH) 0.9 %
5-10 SYRINGE (ML) INJECTION EVERY 8 HOURS
Status: DISCONTINUED | OUTPATIENT
Start: 2017-04-30 | End: 2017-05-01

## 2017-04-30 RX ORDER — MORPHINE SULFATE 0.5 MG/ML
INJECTION, SOLUTION EPIDURAL; INTRATHECAL; INTRAVENOUS AS NEEDED
Status: DISCONTINUED | OUTPATIENT
Start: 2017-04-30 | End: 2017-04-30 | Stop reason: HOSPADM

## 2017-04-30 RX ORDER — ONDANSETRON 2 MG/ML
4 INJECTION INTRAMUSCULAR; INTRAVENOUS
Status: DISCONTINUED | OUTPATIENT
Start: 2017-04-30 | End: 2017-05-03 | Stop reason: HOSPADM

## 2017-04-30 RX ORDER — PHENYLEPHRINE 10 MG/250 ML(40 MCG/ML)IN 0.9 % SOD.CHLORIDE INTRAVENOUS
Status: DISPENSED
Start: 2017-04-30 | End: 2017-05-01

## 2017-04-30 RX ORDER — CEFAZOLIN SODIUM IN 0.9 % NACL 2 G/50 ML
INTRAVENOUS SOLUTION, PIGGYBACK (ML) INTRAVENOUS
Status: DISPENSED
Start: 2017-04-30 | End: 2017-05-01

## 2017-04-30 RX ORDER — SIMETHICONE 80 MG
80 TABLET,CHEWABLE ORAL AS NEEDED
Status: DISCONTINUED | OUTPATIENT
Start: 2017-04-30 | End: 2017-05-03 | Stop reason: HOSPADM

## 2017-04-30 RX ORDER — OXYTOCIN/RINGER'S LACTATE 20/1000 ML
125-500 PLASTIC BAG, INJECTION (ML) INTRAVENOUS ONCE
Status: DISPENSED | OUTPATIENT
Start: 2017-04-30 | End: 2017-05-01

## 2017-04-30 RX ORDER — MIDAZOLAM HYDROCHLORIDE 1 MG/ML
INJECTION, SOLUTION INTRAMUSCULAR; INTRAVENOUS AS NEEDED
Status: DISCONTINUED | OUTPATIENT
Start: 2017-04-30 | End: 2017-04-30 | Stop reason: HOSPADM

## 2017-04-30 RX ORDER — ZOLPIDEM TARTRATE 5 MG/1
5 TABLET ORAL
Status: DISCONTINUED | OUTPATIENT
Start: 2017-04-30 | End: 2017-05-03 | Stop reason: HOSPADM

## 2017-04-30 RX ORDER — SODIUM CHLORIDE 0.9 % (FLUSH) 0.9 %
5-10 SYRINGE (ML) INJECTION AS NEEDED
Status: DISCONTINUED | OUTPATIENT
Start: 2017-04-30 | End: 2017-05-01

## 2017-04-30 RX ORDER — NALBUPHINE HYDROCHLORIDE 10 MG/ML
2.5 INJECTION, SOLUTION INTRAMUSCULAR; INTRAVENOUS; SUBCUTANEOUS
Status: DISCONTINUED | OUTPATIENT
Start: 2017-04-30 | End: 2017-05-03 | Stop reason: HOSPADM

## 2017-04-30 RX ORDER — NALOXONE HYDROCHLORIDE 0.4 MG/ML
0.4 INJECTION, SOLUTION INTRAMUSCULAR; INTRAVENOUS; SUBCUTANEOUS AS NEEDED
Status: DISCONTINUED | OUTPATIENT
Start: 2017-04-30 | End: 2017-05-03 | Stop reason: HOSPADM

## 2017-04-30 RX ADMIN — SODIUM CHLORIDE, SODIUM LACTATE, POTASSIUM CHLORIDE, AND CALCIUM CHLORIDE 1000 ML: 600; 310; 30; 20 INJECTION, SOLUTION INTRAVENOUS at 18:15

## 2017-04-30 RX ADMIN — BUPIVACAINE HYDROCHLORIDE 11 MG: 7.5 INJECTION, SOLUTION EPIDURAL; RETROBULBAR at 19:26

## 2017-04-30 RX ADMIN — KETOROLAC TROMETHAMINE 30 MG: 30 INJECTION, SOLUTION INTRAMUSCULAR at 21:11

## 2017-04-30 RX ADMIN — SODIUM CHLORIDE, SODIUM LACTATE, POTASSIUM CHLORIDE, AND CALCIUM CHLORIDE 125 ML/HR: 600; 310; 30; 20 INJECTION, SOLUTION INTRAVENOUS at 23:45

## 2017-04-30 RX ADMIN — VALACYCLOVIR HYDROCHLORIDE 500 MG: 500 TABLET, FILM COATED ORAL at 09:25

## 2017-04-30 RX ADMIN — FENTANYL CITRATE 100 MCG: 50 INJECTION, SOLUTION INTRAMUSCULAR; INTRAVENOUS at 20:24

## 2017-04-30 RX ADMIN — MORPHINE SULFATE 250 MCG: 0.5 INJECTION, SOLUTION EPIDURAL; INTRATHECAL; INTRAVENOUS at 19:26

## 2017-04-30 RX ADMIN — MIDAZOLAM HYDROCHLORIDE 2 MG: 1 INJECTION, SOLUTION INTRAMUSCULAR; INTRAVENOUS at 20:25

## 2017-04-30 RX ADMIN — CEFAZOLIN 2 G: 1 INJECTION, POWDER, FOR SOLUTION INTRAMUSCULAR; INTRAVENOUS; PARENTERAL at 19:30

## 2017-04-30 RX ADMIN — Medication: at 20:01

## 2017-04-30 RX ADMIN — BUPIVACAINE HYDROCHLORIDE 10 MG: 7.5 INJECTION, SOLUTION EPIDURAL; RETROBULBAR at 19:33

## 2017-04-30 NOTE — PROGRESS NOTES
OB Hospitalist      Called to the bedside as patient now with increased contractions and vaginal bleeding. Patient states that since 4.20 pm she feels the abdominal tightening more , + pinkish vaginal discharge per patient. VSS AF  Abdomen- +palpable contractions  [place] ctx every 2 mins  ,moderate variability +accels,+varaible decels noted  SSE +amniotic fluid mixed with blood, stitch not seen: Visually 4+ cm dilated,Vertex presentation  45 y/o  @ 29 6/7 weeks with rescue cerclage now with PPROM and in active labor  Plan for  delivery due to active HSV outbreak. Plan discussed with Ms. Aletta Cheadle and her  and patient to proceed with delivery.    Status post Magnesium for neuro protection and status post 2 doses of steroids  NICU aware  Ancef 2 gram IV single dose  SCDs in place  Dr. Neisha Ralph aware and on his way for delivery      Dr. Carolyn Killian

## 2017-04-30 NOTE — ROUTINE PROCESS
Bedside and Verbal shift change report given to MERCED (oncoming nurse) by Tasha Chirinos RN (offgoing nurse). Report included the following information SBAR, Kardex and MAR.

## 2017-04-30 NOTE — PROGRESS NOTES
Bedside and Verbal shift change report given to BALAJI Sterling RN (oncoming nurse) by Zhen Asif RN (offgoing nurse). Report included the following information SBAR, Kardex, Intake/Output, MAR and Recent Results. Pt was received from [facility] and transferred to L&D  rm 3 c/o UC's for the last hour and noting increased blood tinged discharge vaginally. IV bolus infusing per Dr. Morgan Milder order. Pt up to  to void. 1850:  Dr. Antonia Quesada notified of pt's arrival in L&D and requested to assess. 1854:  Dr. Antonia Quesada at bedside, discussing plan of care with pt.  1900:  Speculum exam per Dr. Antonia Quesada:  Noted SROM, increased dilation of cervix and increased bleeding. Decision per Dr. Antonia Quesada for c/section. Anesthesia notified and NICU notified. Orders received for antibiotics and pre-op per   Dr. Antonia Quesada.  1906:  Catherene Lever off, Abdomen clipped per Rebeca Thomas RN. 1907:  Dr. Nick Don at bedside discussing plan of care with pt and assessing pt.  1908: Abdomen cleansed with CHG wipes per Rebeca Thomas RN. And ORTIZ hose applied per ELIZABETH Arreola RN. 1910:  Pt up to [facility] to void. 1912:  Pt transferred to OR 1 via hospital bed per Rebeca Thomas Rn and ELIZABETH Arreola RN. 1916:  In OR 1 and transferred to OR table. 1921:  Spinal placed per Dr. Nick Don. 1933:  Spinal redone per Dr. Nick Don. 1935:  Bedside and Verbal shift change report given to CHEMO Cruz RN (oncoming nurse) by BALAJI Sterling RN (offgoing nurse). Report included the following information SBAR, Kardex, Intake/Output, MAR and Recent Results. 1944:  Cerclage removed and Garcia placed per Dr. Wyatt Lucas.

## 2017-04-30 NOTE — ANESTHESIA PROCEDURE NOTES
Spinal Block    Start time: 4/30/2017 7:16 PM  End time: 4/30/2017 7:26 PM  Performed by: Carry Postin by: Oral Closs     Pre-procedure:   Indications: primary anesthetic  Preanesthetic Checklist: risks and benefits discussed and timeout performed      Spinal Block:   Patient Position:  Seated    Prep: Betadine      Location:  L3-4  Technique:  Single shot  Local:  Lidocaine 1%  Local Dose (mL):  3    Needle:   Needle Type:  Pencil-tip  Needle Gauge:  25 G  Attempts:  1      Events: CSF confirmed, no blood with aspiration and no paresthesia        Assessment:  Insertion:  Uncomplicated  Patient tolerance:  Patient tolerated the procedure well with no immediate complications

## 2017-04-30 NOTE — PROGRESS NOTES
Bedside and Verbal shift change report given to 7201 Rhett (oncoming nurse) by Maribel Jordan rN (offgoing nurse). Report included the following information SBAR, Kardex and MAR.     5735 Pt complaining of contractions, states they feel like tightening only. Pt temp 100.0 axillary, FHT 170s. EFM applied for observation. Room is hot and stuffy, engineering called to check thermostat. 1711 Pt temp 98.6 axillary, room slightly cooler. FHR 170s with occasional variables. Pt states she is feeling contractions, nothing noted on toco.  Pt up to BR. Pt reports small amount of red tinged mucous in toilet. Nothing noted on pad.     [facility] Dec Dr. Fide Beltran, she states she is not on call as of 1700, advised to call Dr. Doreen Lopez. Called Dr. Radha Horowitz cell phone, no answer. [facility] answering service, Dr. Doreen Lopez paged. 1740 Contractions on toco, pt states they feel the same as before, slight tightening. FHT 170s with early decelerations noted, changed patient position to left side lying. 1750 No call back from Dr. Doreen Lopez, call placed to Abdi Chowdhury.  Dr. Jeni Bajwa updated on patient, she states she will have Dr. Mi Adams call. [address] [place] with Dr. Mi Adams, order for 1 liter LR bolus. 1803 Pt up to BR, noted red tinged mucous in toilet again. 1805 Bolus started. 1810 spoke with Dr. Mi Adams, wants patient transferred to L&D for observation. 1830 TRANSFER - OUT REPORT:    Verbal report given to Arely Carrasco RN(name) on Shawna Cisneros  being transferred to L&D(unit) for change in patient condition(contractions and fetal tachycardia)       Report consisted of patients Situation, Background, Assessment and   Recommendations(SBAR). Information from the following report(s) SBAR, Kardex and MAR was reviewed with the receiving nurse.     Lines:   Peripheral IV 04/27/17 Left Forearm (Active)   Site Assessment Clean, dry, & intact 4/30/2017  4:35 PM   Phlebitis Assessment 0 4/30/2017  4:35 PM Infiltration Assessment 0 4/30/2017  4:35 PM   Dressing Status Clean, dry, & intact 4/30/2017  4:35 PM   Dressing Type Tape;Transparent 4/30/2017  4:35 PM   Hub Color/Line Status Pink 4/30/2017  4:35 PM   Alcohol Cap Used Yes 4/30/2017  4:35 PM        Opportunity for questions and clarification was provided.       Patient transported with:   Registered Nurse

## 2017-04-30 NOTE — ANESTHESIA PROCEDURE NOTES
Spinal Block    Start time: 4/30/2017 7:28 PM  End time: 4/30/2017 7:33 PM  Performed by: Madhuri Boogie by: Jarod Yap     Pre-procedure:   Indications: primary anesthetic  Preanesthetic Checklist: risks and benefits discussed and timeout performed      Spinal Block:   Patient Position:  Seated    Prep: Betadine      Location:  L3-4  Technique:  Single shot        Needle:   Needle Type:  Pencil-tip  Needle Gauge:  25 G  Attempts:  1      Events: CSF confirmed, no blood with aspiration and no paresthesia        Assessment:  Insertion:  Uncomplicated  Patient tolerance:  Patient tolerated the procedure well with no immediate complications

## 2017-04-30 NOTE — ANESTHESIA PREPROCEDURE EVALUATION
Anesthetic History   No history of anesthetic complications            Review of Systems / Medical History  Patient summary reviewed, nursing notes reviewed and pertinent labs reviewed    Pulmonary  Within defined limits                 Neuro/Psych   Within defined limits           Cardiovascular  Within defined limits                     GI/Hepatic/Renal  Within defined limits              Endo/Other  Within defined limits           Other Findings              Physical Exam    Airway  Mallampati: II  TM Distance: > 6 cm  Neck ROM: normal range of motion   Mouth opening: Normal     Cardiovascular  Regular rate and rhythm,  S1 and S2 normal,  no murmur, click, rub, or gallop             Dental  No notable dental hx       Pulmonary  Breath sounds clear to auscultation               Abdominal  GI exam deferred       Other Findings            Anesthetic Plan    ASA: 1, emergent  Anesthesia type: spinal          Induction: Intravenous  Anesthetic plan and risks discussed with: Patient

## 2017-04-30 NOTE — PROGRESS NOTES
AntePartum Progress Note    Sammy Tinsley  [id number]    Patient admitted for  labor 29w6d Estimated Date of Delivery: 7/10/17 states she does have normal fetal movement. States she did not feel any contractions overnight. Still with some pink tinged discharge, but has not changed. Slept comfortably overnight. Vitals:  Patient Vitals for the past 24 hrs:   BP Temp Pulse Resp SpO2   17 0453 102/60 97.9 °F (36.6 °C) 87  -   17 2214 95/55 98.1 °F (36.7 °C) 92  -   17 1828 95/53 97.9 °F (36.6 °C) 88  -   17 1224 97/56 98.1 °F (36.7 °C) -  -   17 1030 101/60 98 °F (36.7 °C) 78  -   17 0915 - - - - 94 %   17 0914 - - 84 - 92 %   17 0907 - - - - 95 %   17 0848 - - - - 96 %     Temp (24hrs), Av °F (36.7 °C), Min:97.9 °F (36.6 °C), Max:98.1 °F (36.7 °C)    I&O:                  1901 -  0700  In: 3327.1 [P.O.:1700; I.V.:1627.1]  Out: 3700 [Urine:3700]  NST:  Non-reactive but appropriate for gestational age  Uterine Activity: None    Exam:  Patient without distress. Abdomen soft, non-tender               Fundus soft and non tender               Lower extremities edema No                                           Labs: No results found for this or any previous visit (from the past 24 hour(s)). Assessment and Plan:   IUP @ 29w6d   Patient Active Problem List    Diagnosis Date Noted     labor 2017       45 y/o  at 34 6/7 weeks with rescue cerclage placed at 21 weeks admitted for PTL. S/p magnesium (for neuro protection and tocolysis), indocin, Ampicillin. BMTZ complete as of  at 02:30. No current vaginal bleeding except tinge of pink when she wipes. Category I tracing. Active HSV infection-on Valtrex. Vertex but pt will need  for delivery d/t active HSV outbreak. Plan for removal of cerclage if bleeding increases or starts to have painful contractions. S/p NICU and MFM consults.  Now that pt is off of magnesium and indocin, will monitor for recurrence of contractions today. If continues to be stable by tomorrow morning, will discuss pt with MFM for possible dispo.

## 2017-04-30 NOTE — PROGRESS NOTES
Bedside and Verbal shift change report given to ELIZABETH Long RN by AL Vogel RN . Report included the following information SBAR, Kardex and MAR.       0920 pt oob to void, noted moderated amount of pink tinged fluid on pad with a small amount of mucous as well. 1130 pt rang for nurse to view a slightly darker pink fluid on wiping after voiding. Denies any other complaints of cramping at this time. 1420 pt called nurse to room to view recent pad changed when voiding, noted a moderated amout of drainage red in color appears as old blood mixed with mucus drainage. No cramping per pt, noted fetal kicks. Called Dr. Shashank Simon to pass on change in bleeding who asked us to call Dr. Gilbert Hernandez as well. Dr. Gilbert Hernandez states she is ok with this as long as pt is not feeling cramps or lack of fetal movement.      1231-0274 Hourly rounds made  [id number] Hourly rounds made

## 2017-05-01 LAB
ABO + RH BLD: NORMAL
BASOPHILS # BLD AUTO: 0 K/UL (ref 0–0.1)
BASOPHILS # BLD: 0 % (ref 0–1)
BLD PROD TYP BPU: NORMAL
BLD PROD TYP BPU: NORMAL
BLOOD GROUP ANTIBODIES SERPL: NORMAL
BPU ID: NORMAL
BPU ID: NORMAL
CROSSMATCH RESULT,%XM: NORMAL
CROSSMATCH RESULT,%XM: NORMAL
DIFFERENTIAL METHOD BLD: ABNORMAL
EOSINOPHIL # BLD: 0 K/UL (ref 0–0.4)
EOSINOPHIL NFR BLD: 0 % (ref 0–7)
ERYTHROCYTE [DISTWIDTH] IN BLOOD BY AUTOMATED COUNT: 16.4 % (ref 11.5–14.5)
HCT VFR BLD AUTO: 25.6 % (ref 35–47)
HGB BLD-MCNC: 8.2 G/DL (ref 11.5–16)
LYMPHOCYTES # BLD AUTO: 7 % (ref 12–49)
LYMPHOCYTES # BLD: 0.8 K/UL (ref 0.8–3.5)
MCH RBC QN AUTO: 25.5 PG (ref 26–34)
MCHC RBC AUTO-ENTMCNC: 32 G/DL (ref 30–36.5)
MCV RBC AUTO: 79.8 FL (ref 80–99)
MONOCYTES # BLD: 0.6 K/UL (ref 0–1)
MONOCYTES NFR BLD AUTO: 5 % (ref 5–13)
NEUTS BAND NFR BLD MANUAL: 5 %
NEUTS SEG # BLD: 9.6 K/UL (ref 1.8–8)
NEUTS SEG NFR BLD AUTO: 83 % (ref 32–75)
PLATELET # BLD AUTO: 140 K/UL (ref 150–400)
PLATELET COMMENTS,PCOM: ABNORMAL
RBC # BLD AUTO: 3.21 M/UL (ref 3.8–5.2)
RBC MORPH BLD: ABNORMAL
SPECIMEN EXP DATE BLD: NORMAL
STATUS OF UNIT,%ST: NORMAL
STATUS OF UNIT,%ST: NORMAL
UNIT DIVISION, %UDIV: 0
UNIT DIVISION, %UDIV: 0
WBC # BLD AUTO: 11 K/UL (ref 3.6–11)
WBC MORPH BLD: ABNORMAL

## 2017-05-01 PROCEDURE — 65410000002 HC RM PRIVATE OB

## 2017-05-01 PROCEDURE — 74011250636 HC RX REV CODE- 250/636: Performed by: OBSTETRICS & GYNECOLOGY

## 2017-05-01 PROCEDURE — 36415 COLL VENOUS BLD VENIPUNCTURE: CPT | Performed by: OBSTETRICS & GYNECOLOGY

## 2017-05-01 PROCEDURE — 74011250637 HC RX REV CODE- 250/637: Performed by: OBSTETRICS & GYNECOLOGY

## 2017-05-01 PROCEDURE — 85025 COMPLETE CBC W/AUTO DIFF WBC: CPT | Performed by: OBSTETRICS & GYNECOLOGY

## 2017-05-01 PROCEDURE — 74011250636 HC RX REV CODE- 250/636: Performed by: ANESTHESIOLOGY

## 2017-05-01 RX ORDER — IBUPROFEN 400 MG/1
800 TABLET ORAL EVERY 8 HOURS
Status: DISCONTINUED | OUTPATIENT
Start: 2017-05-01 | End: 2017-05-03 | Stop reason: HOSPADM

## 2017-05-01 RX ADMIN — KETOROLAC TROMETHAMINE 30 MG: 30 INJECTION, SOLUTION INTRAMUSCULAR at 14:54

## 2017-05-01 RX ADMIN — SODIUM CHLORIDE, SODIUM LACTATE, POTASSIUM CHLORIDE, AND CALCIUM CHLORIDE 125 ML/HR: 600; 310; 30; 20 INJECTION, SOLUTION INTRAVENOUS at 06:25

## 2017-05-01 RX ADMIN — KETOROLAC TROMETHAMINE 30 MG: 30 INJECTION, SOLUTION INTRAMUSCULAR at 09:05

## 2017-05-01 RX ADMIN — IBUPROFEN 800 MG: 400 TABLET, FILM COATED ORAL at 22:47

## 2017-05-01 RX ADMIN — Medication 10 ML: at 14:55

## 2017-05-01 RX ADMIN — Medication 10 ML: at 14:06

## 2017-05-01 RX ADMIN — KETOROLAC TROMETHAMINE 30 MG: 30 INJECTION, SOLUTION INTRAMUSCULAR at 03:02

## 2017-05-01 NOTE — PROGRESS NOTES
@8948 Bedside shift change (in OR1, preparing for  section) report given to CHEMO Guan RN (oncoming nurse) by Narinder Teran. Mo Vergara RN (offgoing nurse). Report included the following information SBAR, Kardex, Procedure Summary, Intake/Output, MAR, Accordion and Recent Results. @ Cerclage removed in OR 1 by Dr. Mirtha Estevez    @0035 back in room 3 following primary c/s    @2300 Patient taken to [address] but will visit through NICU first.     @2340 TRANSFER - OUT REPORT:    Verbal report given to CHARLOTTE Jules RN(name) on Catherine Chung  being transferred to APU(unit) for routine post - op       Report consisted of patients Situation, Background, Assessment and   Recommendations(SBAR). Information from the following report(s) SBAR, Kardex, OR Summary, Intake/Output and Recent Results was reviewed with the receiving nurse. Lines:   Peripheral IV 17 Left Forearm (Active)   Site Assessment Clean, dry, & intact 2017  4:35 PM   Phlebitis Assessment 0 2017  4:35 PM   Infiltration Assessment 0 2017  4:35 PM   Dressing Status Clean, dry, & intact 2017  4:35 PM   Dressing Type Tape;Transparent 2017  4:35 PM   Hub Color/Line Status Pink 2017  4:35 PM   Alcohol Cap Used Yes 2017  4:35 PM        Opportunity for questions and clarification was provided.       Patient transported with:   Registered Nurse

## 2017-05-01 NOTE — PROGRESS NOTES
Bedside and Verbal shift change report given to Graciela Delaney (oncoming nurse) by Joe Hernandez RN (offgoing nurse). Report included the following information SBAR, Kardex and MAR.

## 2017-05-01 NOTE — OP NOTES
[facility], [address]   OP NOTE       Name:  Faviola Celis   MR#:  [id number]   :  1975   Account #:  [de-identified]    Surgery Date:  2017   Date of Adm:  2017       DATE OF PROCEDURE: 2017. PREOPERATIVE DIAGNOSES:   1. Intrauterine pregnancy at 34 and 6/7 weeks. 2. Status post rescue cerclage at 21 weeks. 3. Labor. POSTOPERATIVE DIAGNOSES:   1. Intrauterine pregnancy at 34 and 6/7 weeks. 2. Status post rescue cerclage at 21 weeks. 3. Labor. PROCEDURE PERFORMED: Removal of cerclage followed by   classical  section. SURGEON: Laya Colon MD.    ASSISTANT: Tito Clark MD.    ANESTHESIA: Spinal.    ESTIMATED BLOOD LOSS: 800 mL. SPECIMENS REMOVED:placenta    COMPLICATIONS: None. DESCRIPTION OF PROCEDURE: After being properly identified, the   patient was taken into the operating room and placed on the operating   table in dorsal supine position. After an adequate level of spinal   anesthesia was assured, the patient was put in stirrups. A speculum was inserted into the vagina and the cerclage was cut and   removed. The patient was placed back in the dorsal supine position and was   prepped and draped in the usual manner. A Pfannenstiel incision was   made approximately 2 fingerbreadths above the pubic symphysis and   taken down through skin and subcutaneous tissue to the fascia. The   fascia was incised and extended laterally. The linea alba was incised   and the rectus muscles were  laterally. The peritoneal cavity   was entered and the peritoneum was extended superiorly and inferiorly   to the level of the bladder. There was no well-developed lower uterine   segment; therefore, a classical  section was performed. Vertical incision was made in the uterus down to the amniotic sac. The   sac was ruptured and clear fluid was noted.  The infant was   subsequently delivered and the cord was clamped and cut and the   infant was handed off the table to the neonatologist. The placenta was   then manually extracted and sent to surgical pathology. The uterus   was then closed in 3 layers using running stitches of 0 Monocryl   suture. Hemostasis was complete along the suture line. The cul-de-sac   was suctioned of blood and the uterus was returned to the abdominal   cavity. The rectus muscles and peritoneum were reapproximated in the   midline with 2 interrupted stitches of #1 chromic suture. The fascia was   closed with a running stitch of 0 PDS suture. The skin was closed with   staples. The patient tolerated the procedure well. The sponge, needle,   and instrument counts were correct x3. The patient was taken to the   recovery room in satisfactory condition.          MD CHUYITA Patel / Harpreet Gutiérrez   D:  04/30/2017   20:45   T:  05/01/2017   00:12   Job #:  [id number]

## 2017-05-01 NOTE — ANESTHESIA POSTPROCEDURE EVALUATION
Post-Anesthesia Evaluation and Assessment    Patient: Moncho Benavidez MRN: [id number]  SSN: [id number]    YOB: 1975  Age: 46 y.o. Sex: female       Cardiovascular Function/Vital Signs  Visit Vitals    /68 (BP 1 Location: Right arm, BP Patient Position: Lying left side)    Pulse 80    Temp 37.2 °C (99 °F)    Resp 24    Ht 5' (1.524 m)    Wt 63.5 kg (140 lb)    SpO2 94%    Breastfeeding No    BMI 27.34 kg/m2       Patient is status post spinal anesthesia for Procedure(s):   SECTION. Nausea/Vomiting: None    Postoperative hydration reviewed and adequate. Pain:  Pain Scale 1: Labor Algorithm/Pain Intensity (17 1842)  Pain Intensity 1: 0 (17 1730)   Managed    Neurological Status:   Neuro (WDL): Within Defined Limits (17 1600)   At baseline    Mental Status and Level of Consciousness: Arousable    Pulmonary Status:   O2 Device: Room air (17 1635)   Adequate oxygenation and airway patent    Complications related to anesthesia: None    Post-anesthesia assessment completed.  No concerns    Signed By: Kristin Alvarez MD     2017

## 2017-05-01 NOTE — PROGRESS NOTES
Verbal shift change report given to ARELY santos RN (oncoming nurse) by Gian Jules RN (offgoing nurse). Report included the following information SBAR, Intake/Output, MAR and Recent Results. 1000  Garcia discontinued. IV fluids continued until current bag of fluids finishes(urine is sury colored). Tolerating fluids by mouth. 1030  To NICU with spouse. 1230  Up to bathroom to void and perform orion care. Steady gait. Pain managed with Toradol. Pumping. Tolerated soup and sandwich for lunch.

## 2017-05-01 NOTE — PROGRESS NOTES
TRANSFER - IN REPORT:    Verbal report received from Joe RN(name) on Lemuel Bulls  being received from L&D(unit) for routine post - op      Report consisted of patients Situation, Background, Assessment and   Recommendations(SBAR). Information from the following report(s) SBAR, OR Summary, Intake/Output, MAR, Accordion and Recent Results was reviewed with the receiving nurse. Opportunity for questions and clarification was provided. Assessment completed upon patients arrival to unit and care assumed.            Hourly rounds completed: Jaydon Mendoza

## 2017-05-01 NOTE — PROGRESS NOTES
Anesthesia Post Operative Day 1      The patient is status post C Section with spinal  anesthesia and Duramorph for pain. The patient relates the following scales: pain,mild ; itching, none ; nausea, none. All sympyoms were treated with medications per protocol. The patient is up and ambulating and has minimal complaints. Plan: Continue to treat breakthrough symptoms as needed with protocol medictions.

## 2017-05-02 PROCEDURE — 74011250637 HC RX REV CODE- 250/637: Performed by: OBSTETRICS & GYNECOLOGY

## 2017-05-02 PROCEDURE — 65410000002 HC RM PRIVATE OB

## 2017-05-02 RX ADMIN — IBUPROFEN 800 MG: 400 TABLET, FILM COATED ORAL at 13:29

## 2017-05-02 RX ADMIN — IBUPROFEN 800 MG: 400 TABLET, FILM COATED ORAL at 06:37

## 2017-05-02 RX ADMIN — IBUPROFEN 800 MG: 400 TABLET, FILM COATED ORAL at 22:14

## 2017-05-02 NOTE — LACTATION NOTE
Initial [facility] LC visit - This is moms first baby and she is planning to breast feed. She has not had an opportunity to take any classes. Discussed the importance of breast milk for babies in general, and the extra importance and benefits for pre-term babies. Discussed pumping and storage of breast milk. Mom has started pumping since her baby is now in the NICU    Discussed family centered care as it relates to the term baby and what we do in the NICU to keep the family at the center of our care. Answered moms questions. Will continue to follow.

## 2017-05-02 NOTE — PROGRESS NOTES
Bedside and Verbal shift change report given to CHARLOTTE Jules RN (oncoming nurse) by Jay Brower RN (offgoing nurse). Report included the following information SBAR, OR Summary, Intake/Output, MAR, Accordion and Recent Results. 2325: Pt in bed sleeping. Family at bedside.      Hourly rounds completed: [id number]

## 2017-05-02 NOTE — PROGRESS NOTES
0730  Bedside shift change report given to ARELY Resendez RN (oncoming nurse) by Juan Diego Jules RN (offgoing nurse). Report included the following information SBAR, Kardex, MAR and Recent Results.    Hourly rounds [id number]  Hourly rounds [id number]

## 2017-05-02 NOTE — PROGRESS NOTES
Bedside and Verbal shift change report given to ELIZABETH Bernal RN by . Report included the following information SBAR, Kardex and MAR.

## 2017-05-02 NOTE — PROGRESS NOTES
Post-Operative  Day 2    [facility] for the patient's :  Nikki Valdivia [[id number]]   , Classical   Patient doing well without unusual complaints. Passing flatus, voiding and ambulating without difficulty. Tolerating regular diet. Vitals:  Visit Vitals    /68 (BP 1 Location: Right arm, BP Patient Position: At rest)    Pulse 87    Temp 98 °F (36.7 °C)    Resp 18    Ht 5' (1.524 m)    Wt 63.5 kg (140 lb)    SpO2 94%    Breastfeeding Unknown    BMI 27.34 kg/m2     Temp (24hrs), Av.2 °F (36.8 °C), Min:97.6 °F (36.4 °C), Max:98.8 °F (37.1 °C)        Exam:      Patient without distress. Abdomen: soft, expected tenderness, fundus firm                Wound incision clean, dry and intact               Lower extremities are nontender without edema. No cords    Labs:   Lab Results   Component Value Date/Time    WBC 11.0 2017 03:15 AM    WBC 8.8 2017 01:01 AM    WBC 5.8 2017 10:57 AM    HGB 8.2 2017 03:15 AM    HGB 9.4 2017 01:01 AM    HGB 10.4 2017 10:57 AM    HCT 25.6 2017 03:15 AM    HCT 29.3 2017 01:01 AM    HCT 31.1 2017 10:57 AM    PLATELET 655  03:15 AM    PLATELET 932  01:01 AM    PLATELET 182  10:57 AM       No results found for this or any previous visit (from the past 24 hour(s)). Assessment: Post-Op day 2, doing well      Plan:   1.  Routine post-operative care

## 2017-05-03 VITALS
HEART RATE: 80 BPM | SYSTOLIC BLOOD PRESSURE: 112 MMHG | WEIGHT: 140 LBS | RESPIRATION RATE: 16 BRPM | DIASTOLIC BLOOD PRESSURE: 64 MMHG | HEIGHT: 60 IN | BODY MASS INDEX: 27.48 KG/M2 | OXYGEN SATURATION: 94 % | TEMPERATURE: 97.8 F

## 2017-05-03 PROCEDURE — 74011250636 HC RX REV CODE- 250/636: Performed by: OBSTETRICS & GYNECOLOGY

## 2017-05-03 PROCEDURE — 74011250637 HC RX REV CODE- 250/637: Performed by: OBSTETRICS & GYNECOLOGY

## 2017-05-03 PROCEDURE — 90715 TDAP VACCINE 7 YRS/> IM: CPT | Performed by: OBSTETRICS & GYNECOLOGY

## 2017-05-03 RX ORDER — IBUPROFEN 800 MG/1
800 TABLET ORAL EVERY 8 HOURS
Qty: 40 TAB | Refills: 1 | Status: SHIPPED | OUTPATIENT
Start: 2017-05-03 | End: 2018-05-31

## 2017-05-03 RX ORDER — OXYCODONE AND ACETAMINOPHEN 7.5; 325 MG/1; MG/1
1 TABLET ORAL
Qty: 20 TAB | Refills: 0 | Status: SHIPPED | OUTPATIENT
Start: 2017-05-03 | End: 2018-05-31

## 2017-05-03 RX ADMIN — IBUPROFEN 800 MG: 400 TABLET, FILM COATED ORAL at 06:03

## 2017-05-03 RX ADMIN — IBUPROFEN 800 MG: 400 TABLET, FILM COATED ORAL at 14:26

## 2017-05-03 RX ADMIN — TETANUS TOXOID, REDUCED DIPHTHERIA TOXOID AND ACELLULAR PERTUSSIS VACCINE, ADSORBED 0.5 ML: 5; 2.5; 8; 8; 2.5 SUSPENSION INTRAMUSCULAR at 12:46

## 2017-05-03 NOTE — PROGRESS NOTES
2000: Bedside shift change report given to Kenney Ramirez (oncoming nurse) by Cruz Castorena (offgoing nurse). Report included the following information SBAR, Kardex, Procedure Summary, Intake/Output, MAR and Recent Results. 2210:  VSS. C/o 4/10 incisional pain. Gave scheduled Motrin.

## 2017-05-03 NOTE — LACTATION NOTE
Mother has been pumping for baby in NICU. She has been inconsistent with pumping regimen, skipping night sessions, sleeping for 8 or more hours. We discussed supply and demand, and the process of lactogenesis. Mother taught hand expression. She was unable to tolerate it due to aching breasts. Mother's milk is coming in, she is becoming engorged. Breasts were not releasing milk due to excess swelling in large pendulous breasts. Swelling reduced with reverse pressure softening. Pumping produced milk from left breast but milk did not express well from right due to swelling. Mother taught to use compression with pumping to better drain breasts. We used reverse pressure softening and massage to decrease swelling, ice for 10 minutes after pumping and elevation of breasts between feedings. Mother was taught how to assess her breasts. I strongly encouraged mother to pump every 2-3 hours today as milk is coming in, do not skip pump sessions, and follow engorgement management regimen as taught or engorgement will become worse and could put mother at risk for pathologic engorgement and mastitis. Mother is talking with insurance company about a hospital grade pump for use after discharge.

## 2017-05-03 NOTE — DISCHARGE INSTRUCTIONS
Section: What to Expect at [address] [place] [address]    A  section, or , is surgery to deliver your baby through a cut, called an incision, that the doctor makes in your lower belly and uterus. You may have some pain in your lower belly and need pain medicine for 1 to 2 weeks. You can expect some vaginal bleeding for several weeks. You will probably need about 6 weeks to fully recover. It is important to take it easy while the incision is healing. Avoid heavy lifting, strenuous activities, or exercises that strain the belly muscles while you are recovering. Ask a family member or friend for help with housework, cooking, and shopping. This care sheet gives you a general idea about how long it will take for you to recover. But each person recovers at a different pace. Follow the steps below to get better as quickly as possible. How can you care for yourself at home? Activity  · Rest when you feel tired. Getting enough sleep will help you recover. · Try to walk each day. Start by walking a little more than you did the day before. Bit by bit, increase the amount you walk. Walking boosts blood flow and helps prevent pneumonia, constipation, and blood clots. · Avoid strenuous activities, such as bicycle riding, jogging, weightlifting, and aerobic exercise, for 6 weeks or until your doctor says it is okay. · Until your doctor says it is okay, do not lift anything heavier than your baby. · Do not do sit-ups or other exercises that strain the belly muscles for 6 weeks or until your doctor says it is okay. · Hold a pillow over your incision when you cough or take deep breaths. This will support your belly and decrease your pain. · You may shower as usual. Pat the incision dry when you are done. · You will have some vaginal bleeding. Wear sanitary pads. Do not douche or use tampons until your doctor says it is okay. · Ask your doctor when you can drive again.   · You will probably need to take at least 6 weeks off work. It depends on the type of work you do and how you feel. · Ask your doctor when it is okay for you to have sex. Diet  · You can eat your normal diet. If your stomach is upset, try bland, low-fat foods like plain rice, broiled chicken, toast, and yogurt. · Drink plenty of fluids (unless your doctor tells you not to). · You may notice that your bowel movements are not regular right after your surgery. This is common. Try to avoid constipation and straining with bowel movements. You may want to take a fiber supplement every day. If you have not had a bowel movement after a couple of days, ask your doctor about taking a mild laxative. · If you are breastfeeding, do not drink any alcohol. Medicines  · Your doctor will tell you if and when you can restart your medicines. He or she will also give you instructions about taking any new medicines. · If you take blood thinners, such as warfarin (Coumadin), clopidogrel (Plavix), or aspirin, be sure to talk to your doctor. He or she will tell you if and when to start taking those medicines again. Make sure that you understand exactly what your doctor wants you to do. · Take pain medicines exactly as directed. ¨ If the doctor gave you a prescription medicine for pain, take it as prescribed. ¨ If you are not taking a prescription pain medicine, ask your doctor if you can take an over-the-counter medicine. · If you think your pain medicine is making you sick to your stomach:  ¨ Take your medicine after meals (unless your doctor has told you not to). ¨ Ask your doctor for a different pain medicine. · If your doctor prescribed antibiotics, take them as directed. Do not stop taking them just because you feel better. You need to take the full course of antibiotics. Incision care  · If you have strips of tape on the incision, leave the tape on for a week or until it falls off. · Wash the area daily with warm, soapy water, and pat it dry. Don't use hydrogen peroxide or alcohol, which can slow healing. You may cover the area with a gauze bandage if it weeps or rubs against clothing. Change the bandage every day. · Keep the area clean and dry. Other instructions  · If you breastfeed your baby, you may be more comfortable while you are healing if you place the baby so that he or she is not resting on your belly. Try tucking your baby under your arm, with his or her body along the side you will be feeding on. Support your baby's upper body with your arm. With that hand you can control your baby's head to bring his or her mouth to your breast. This is sometimes called the football hold. Follow-up care is a key part of your treatment and safety. Be sure to make and go to all appointments, and call your doctor if you are having problems. It's also a good idea to know your test results and keep a list of the medicines you take. When should you call for help? Call 911 anytime you think you may need emergency care. For example, call if:  · You passed out (lost consciousness). · You have symptoms of a blood clot in your lung (called a pulmonary embolism). These may include:  ¨ Sudden chest pain. ¨ Trouble breathing. ¨ Coughing up blood. · You have thoughts of harming yourself, your baby, or another person. Call your doctor now or seek immediate medical care if:  · You have severe vaginal bleeding. This means that you are soaking through a pad every hour for 2 or more hours. · You are dizzy or lightheaded, or you feel like you may faint. · You have new or more belly pain. · You have loose stitches, or your incision comes open. · You have symptoms of infection, such as:  ¨ Increased pain, swelling, warmth, or redness. ¨ Red streaks leading from the incision. ¨ Pus draining from the incision. ¨ A fever.   · You have symptoms of a blood clot in your leg (called a deep vein thrombosis), such as:  ¨ Pain in your calf, back of the knee, thigh, or groin.  ¨ Redness and swelling in your leg or groin. Watch closely for changes in your health, and be sure to contact your doctor if:  · You feel sad, anxious, or hopeless for more than a few days. · You do not get better as expected. Where can you learn more? Go to http://kris-dominguez.info/. Enter M806 in the search box to learn more about \" Section: What to Expect at Home. \"  Current as of: May 30, 2016  Content Version: 11.2  © 4605-9876 [employer]. Care instructions adapted under license by [employer] (which disclaims liability or warranty for this information). If you have questions about a medical condition or this instruction, always ask your healthcare professional. [address] any warranty or liability for your use of this information. Patient Discharge Instructions    Cosmo Urban / [id number] : 1975    Admitted 2017 Discharged: 5/3/2017       Personal Items    Please collect from your nurse all clothing which belongs to you. Please collect from the  any valuables you stored during your stay, and please remember all of your personal items, such as dentures, canes, and eyeglasses. Activity Instructions    You must avoid lifting more than the weight of your baby in pounds until your physician instructs you differently. You should avoid driving and sexual activity. You may resume driving and sexual activity. I understand that if any problems occur once I am at home I am to contact my physician. I understand and acknowledge receipt of the instructions indicated above.

## 2017-05-03 NOTE — DISCHARGE SUMMARY
Obstetrical Discharge Summary     Name: Sammy Tinsley MRN: [id number]  SSN: [id number]    YOB: 1975  Age: 46 y.o. Sex: female      Admit Date: 2017    Discharge Date: 5/3/2017     Admitting Physician: Astrid Delaney MD     Attending Physician: Katie Urrutia MD     * Admission Diagnoses: maternity   labor    * Discharge Diagnoses:   Information for the patient's :  Dusty Ramos [[id number]]   Delivery of a 1.52 kg male infant via , Classical on 2017 at 8:01 PM  by . Apgars were 6 and 7. Additional Diagnoses:   Hospital Problems as of 5/3/2017  Date Reviewed: 2017          Codes Class Noted - Resolved POA     labor ICD-10-CM: O60.00  ICD-9-CM: 644.00  2017 - Present Unknown             Lab Results   Component Value Date/Time    ABO/Rh(D) A POSITIVE 2017 01:01 AM    Rubella, External immune 2016    GrBStrep, External positive 2016    ABO,Rh A Positive 2016    There is no immunization history for the selected administration types on file for this patient. * Procedures: CLASSICAL  SECTION  Procedure(s):   SECTION           * Discharge Condition: good and stable    * Hospital Course: Normal hospital course following the delivery. * Disposition: Home    Discharge Medications:   Current Discharge Medication List      START taking these medications    Details   ibuprofen (MOTRIN) 800 mg tablet Take 1 Tab by mouth every eight (8) hours. Qty: 40 Tab, Refills: 1      oxyCODONE-acetaminophen (PERCOCET 7.5) 7.5-325 mg per tablet Take 1 Tab by mouth every four (4) hours as needed. Max Daily Amount: 6 Tabs. Qty: 20 Tab, Refills: 0         CONTINUE these medications which have NOT CHANGED    Details   ferrous sulfate (SLOW FE) 142 mg (45 mg iron) ER tablet Take 142 mg by mouth Daily (before breakfast). prenatal multivit-ca-min-fe-fa tab Take  by mouth.          STOP taking these medications valACYclovir (VALTREX) 500 mg tablet Comments:   Reason for Stopping:               * Follow-up Care/Patient Instructions:   Activity: Activity as tolerated, No sex for 6 weeks and No driving while on analgesics  Diet: Regular Diet  Wound Care: Keep wound clean and dry    Follow-up Information     Follow up With Details Comments Marvel Mcclure MD   7 [facility] [address]  [phone]      Gold Guerrero MD In 2 weeks  [address] [place] [address] [address] [address]  [phone]             Signed By:  Radames Lemus MD     May 3, 2017

## 2017-05-03 NOTE — PROGRESS NOTES
Post-Operative Day Number 3 Progress/Discharge Note    Patient doing well post-op day 3 from  delivery without significant complaints. Pain controlled on current medication. Voiding without difficulty, normal lochia. Vitals:  Patient Vitals for the past 8 hrs:   BP Temp Pulse Resp   17 0602 102/66 97.5 °F (36.4 °C) 78 16     Temp (24hrs), Av.6 °F (36.4 °C), Min:97.3 °F (36.3 °C), Max:98.1 °F (36.7 °C)      Vital signs stable, afebrile. Exam:  Patient without distress. Abdomen soft, fundus firm at level of umbilicus, non tender. Incision stapled, dry, and clean without erythema. Lower extremities are negative for swelling, cords or tenderness. Lab/Data Review: All lab results for the last 24 hours reviewed. Assessment and Plan:  Patient appears to be having uncomplicated post- course. Continue routine post-op care and maternal education. Plan discharge for today with follow up in our office in 1-2 weeks. Baby boy in NICU.

## 2017-05-03 NOTE — PROGRESS NOTES
Bedside and Verbal shift change report given to Anirduh Plascencia RN (oncoming nurse) by Aftab Powers RN (offgoing nurse). Report included the following information SBAR, Kardex and MAR.

## 2018-02-16 ENCOUNTER — HOSPITAL ENCOUNTER (OUTPATIENT)
Dept: MAMMOGRAPHY | Age: 43
Discharge: HOME | End: 2018-02-16
Attending: OBSTETRICS & GYNECOLOGY
Payer: COMMERCIAL

## 2018-02-16 DIAGNOSIS — R92.8 ABNORMAL MAMMOGRAM: ICD-10-CM

## 2018-02-16 DIAGNOSIS — Z12.31 VISIT FOR SCREENING MAMMOGRAM: ICD-10-CM

## 2018-02-16 PROCEDURE — 77067 SCR MAMMO BI INCL CAD: CPT

## 2018-02-16 PROCEDURE — 76642 ULTRASOUND BREAST LIMITED: CPT

## 2018-02-16 PROCEDURE — 77065 DX MAMMO INCL CAD UNI: CPT

## 2018-05-14 ENCOUNTER — HOSPITAL ENCOUNTER (OUTPATIENT)
Dept: PERINATAL CARE | Age: 43
Discharge: HOME | End: 2018-05-14
Attending: OBSTETRICS & GYNECOLOGY
Payer: COMMERCIAL

## 2018-05-14 PROCEDURE — 76815 OB US LIMITED FETUS(S): CPT | Performed by: OBSTETRICS & GYNECOLOGY

## 2018-05-29 ENCOUNTER — ANESTHESIA EVENT (OUTPATIENT)
Dept: LABOR AND DELIVERY | Age: 43
End: 2018-05-29
Payer: COMMERCIAL

## 2018-05-31 ENCOUNTER — HOSPITAL ENCOUNTER (OUTPATIENT)
Dept: OTHER | Age: 43
Discharge: HOME | End: 2018-05-31
Payer: COMMERCIAL

## 2018-05-31 VITALS — HEIGHT: 60 IN | BODY MASS INDEX: 27.48 KG/M2 | WEIGHT: 140 LBS

## 2018-05-31 LAB
ERYTHROCYTE [DISTWIDTH] IN BLOOD BY AUTOMATED COUNT: 12.9 % (ref 11.5–14.5)
HCT VFR BLD AUTO: 34.2 % (ref 35–47)
HGB BLD-MCNC: 11.3 G/DL (ref 11.5–16)
MCH RBC QN AUTO: 27.8 PG (ref 26–34)
MCHC RBC AUTO-ENTMCNC: 33 G/DL (ref 30–36.5)
MCV RBC AUTO: 84 FL (ref 80–99)
NRBC # BLD: 0 K/UL (ref 0–0.01)
NRBC BLD-RTO: 0 PER 100 WBC
PLATELET # BLD AUTO: 165 K/UL (ref 150–400)
PMV BLD AUTO: 10.4 FL (ref 8.9–12.9)
RBC # BLD AUTO: 4.07 M/UL (ref 3.8–5.2)
WBC # BLD AUTO: 4.4 K/UL (ref 3.6–11)

## 2018-05-31 PROCEDURE — 36415 COLL VENOUS BLD VENIPUNCTURE: CPT | Performed by: OBSTETRICS & GYNECOLOGY

## 2018-05-31 PROCEDURE — 85027 COMPLETE CBC AUTOMATED: CPT | Performed by: OBSTETRICS & GYNECOLOGY

## 2018-05-31 RX ORDER — PROGESTERONE 100 MG/1
100 CAPSULE ORAL DAILY
COMMUNITY
End: 2018-06-01

## 2018-05-31 RX ORDER — GUAIFENESIN 100 MG/5ML
81 LIQUID (ML) ORAL DAILY
COMMUNITY
End: 2018-06-01

## 2018-06-01 ENCOUNTER — HOSPITAL ENCOUNTER (OUTPATIENT)
Age: 43
Discharge: HOME | End: 2018-06-01
Attending: OBSTETRICS & GYNECOLOGY | Admitting: OBSTETRICS & GYNECOLOGY
Payer: COMMERCIAL

## 2018-06-01 ENCOUNTER — ANESTHESIA (OUTPATIENT)
Dept: LABOR AND DELIVERY | Age: 43
End: 2018-06-01
Payer: COMMERCIAL

## 2018-06-01 VITALS
HEIGHT: 60 IN | OXYGEN SATURATION: 99 % | TEMPERATURE: 97.9 F | RESPIRATION RATE: 16 BRPM | DIASTOLIC BLOOD PRESSURE: 59 MMHG | WEIGHT: 140 LBS | HEART RATE: 80 BPM | BODY MASS INDEX: 27.48 KG/M2 | SYSTOLIC BLOOD PRESSURE: 103 MMHG

## 2018-06-01 PROBLEM — N88.3 INCOMPETENT CERVIX: Status: ACTIVE | Noted: 2018-06-01

## 2018-06-01 PROCEDURE — 75410000003 HC RECOV DEL/VAG/CSECN EA 0.5 HR: Performed by: OBSTETRICS & GYNECOLOGY

## 2018-06-01 PROCEDURE — 74011000250 HC RX REV CODE- 250

## 2018-06-01 PROCEDURE — 74011250637 HC RX REV CODE- 250/637: Performed by: OBSTETRICS & GYNECOLOGY

## 2018-06-01 PROCEDURE — 74011250636 HC RX REV CODE- 250/636

## 2018-06-01 PROCEDURE — 96360 HYDRATION IV INFUSION INIT: CPT

## 2018-06-01 PROCEDURE — 99285 EMERGENCY DEPT VISIT HI MDM: CPT

## 2018-06-01 PROCEDURE — 76010000389 HC LDRP PROCEDURE 0.5 TO 1 HR: Performed by: OBSTETRICS & GYNECOLOGY

## 2018-06-01 PROCEDURE — 77030014125 HC TY EPDRL BBMI -B: Performed by: ANESTHESIOLOGY

## 2018-06-01 PROCEDURE — 77030003671 HC NDL SPN HAVL -B: Performed by: ANESTHESIOLOGY

## 2018-06-01 PROCEDURE — 74011250636 HC RX REV CODE- 250/636: Performed by: OBSTETRICS & GYNECOLOGY

## 2018-06-01 PROCEDURE — 76060000078 HC EPIDURAL ANESTHESIA: Performed by: OBSTETRICS & GYNECOLOGY

## 2018-06-01 RX ORDER — SODIUM CHLORIDE, SODIUM LACTATE, POTASSIUM CHLORIDE, CALCIUM CHLORIDE 600; 310; 30; 20 MG/100ML; MG/100ML; MG/100ML; MG/100ML
125 INJECTION, SOLUTION INTRAVENOUS CONTINUOUS
Status: DISCONTINUED | OUTPATIENT
Start: 2018-06-01 | End: 2018-06-01 | Stop reason: HOSPADM

## 2018-06-01 RX ORDER — BUPIVACAINE HYDROCHLORIDE 7.5 MG/ML
INJECTION, SOLUTION EPIDURAL; RETROBULBAR AS NEEDED
Status: DISCONTINUED | OUTPATIENT
Start: 2018-06-01 | End: 2018-06-01 | Stop reason: HOSPADM

## 2018-06-01 RX ORDER — PYRIDOXINE HCL (VITAMIN B6) 25 MG
25 TABLET ORAL 3 TIMES DAILY
COMMUNITY
End: 2018-06-01

## 2018-06-01 RX ORDER — ACETAMINOPHEN 325 MG/1
650 TABLET ORAL
Status: DISCONTINUED | OUTPATIENT
Start: 2018-06-01 | End: 2018-06-01 | Stop reason: HOSPADM

## 2018-06-01 RX ADMIN — ACETAMINOPHEN 650 MG: 325 TABLET ORAL at 13:17

## 2018-06-01 RX ADMIN — SODIUM CHLORIDE, SODIUM LACTATE, POTASSIUM CHLORIDE, AND CALCIUM CHLORIDE 125 ML/HR: 600; 310; 30; 20 INJECTION, SOLUTION INTRAVENOUS at 07:35

## 2018-06-01 RX ADMIN — ACETAMINOPHEN 650 MG: 325 TABLET ORAL at 09:37

## 2018-06-01 RX ADMIN — BUPIVACAINE HYDROCHLORIDE 1.1 ML: 7.5 INJECTION, SOLUTION EPIDURAL; RETROBULBAR at 08:25

## 2018-06-01 NOTE — BRIEF OP NOTE
BRIEF OPERATIVE NOTE    Date of Procedure: 6/1/2018   Preoperative Diagnosis: 12-weeks' pregnancy with Incompetent cervix  Postoperative Diagnosis: same with cerclage    Procedure(s):  CERCLAGE  Surgeon(s) and Role:     * Aryan Richard MD - Primary         Surgical Assistant: Shay Chamberlain Student    Surgical Staff:  Circ-1: Neha Reddy RN  Circ-2: Alena Clarke RN  Scrub Tech-1: Catheryn Seat  No case tracking events are documented in the log. Anesthesia: Spinal   Estimated Blood Loss: 50 milliliters  Specimens: * No specimens in log *   Findings: Anterior lip of the cervix was about 0.5 to 1 cm long. Cervix is deficient posteriorly and appears scarred.   Complications: None  Implants: * No implants in log *

## 2018-06-01 NOTE — OP NOTES
OPERATIVE REPORT   PREOPERATIVE DIAGNOSIS: A 12-week pregnancy with cervical incompetence. POSTOPERATIVE DIAGNOSIS: Same with cerclage  OPERATIVE PROCEDURE: Prophylactic Cervical cerclage. SURGEON: Emiliano Jean MD   ASSISTANT: Shay De Los Santos Student  ANESTHESIA: Spinal.   FINDINGS: Anterior lip of the cervix is 0.5 to 1 cm long; cervix is deficient posteriorly and is flushed with the vagina. COMPLICATIONS: None. DESCRIPTION OF PROCEDURE: Before transferring to OR, I explained the   procedures and complications of cervical cerclage that includes hemorrhage,   infection, and injury to bladder or bowel. Informed consent was obtained,   and the patient was transferred to the operating room. Spinal anesthesia was administered by the anesthesiologist. The patient was   then placed in the dorsal lithotomy position and vulva and vagina were   prepped with Betadine and draped. A weighted speculum was inserted into the vagina, and the   anterior lip of the cervix was held by a pair of ring forceps. Cervix appears flushed with the vagina. Anterior lip was short about 0.5 to 1 cm long and posteriorly, the cervical lip could not be defined. With the help with Bovie, about 1/2-inch long incision was made at the   cervical-vaginal junction and the bladder was pushed up. Another incision of about 1 cm was made in the posterior vaginal wall and a space was created posteriorly between the cervix and the rectum. A 5 mm Mersilene tape stitch was taken at the 11-o'clock position and the suture was placed   circumferentially to exit at the 1-o'clock position. The needle was cut off   and about 5 knots were placed anteriorly. Good hemostasis was achieved. Bladder was catheterized to rule out hematuria and about 5 mL of urine was drained. Rectal examination revealed no inadvertent sutures. All the instruments and sponges were removed from the vagina and the count   was correct.  The patient was then transferred to labor and delivery in   stable condition. ESTIMATED BLOOD LOSS: 50 mL. SPECIMENS: None.   Kayla Wisdom MD  6/1/2018

## 2018-06-01 NOTE — ANESTHESIA PREPROCEDURE EVALUATION
Anesthetic History   No history of anesthetic complications            Review of Systems / Medical History  Patient summary reviewed, nursing notes reviewed and pertinent labs reviewed    Pulmonary  Within defined limits                 Neuro/Psych   Within defined limits           Cardiovascular  Within defined limits                Exercise tolerance: >4 METS     GI/Hepatic/Renal  Within defined limits              Endo/Other  Within defined limits           Other Findings   Comments: Incompetent cervix           Physical Exam    Airway  Mallampati: II  TM Distance: 4 - 6 cm  Neck ROM: normal range of motion   Mouth opening: Normal     Cardiovascular  Regular rate and rhythm,  S1 and S2 normal,  no murmur, click, rub, or gallop             Dental  No notable dental hx       Pulmonary  Breath sounds clear to auscultation               Abdominal  GI exam deferred       Other Findings            Anesthetic Plan    ASA: 2  Anesthesia type: spinal            Anesthetic plan and risks discussed with: Patient

## 2018-06-01 NOTE — H&P
Maternal-Fetal Medicine    Ms. Ngo Found  at 12-weeks gestation, is admitted for prophylactic cerclage. Obstetric history is significant for a rescue cerclage at 24 weeks gestation on 17 when the cervix was found to be 3 to 4 cm dilated. Amnioreduction was performed before cerclage procedure. Patient went into spontaneous labor at 29 weeks and after a few days of hospitalization, she had a classical  delivery on 17 of a male infant. Her son is in good health without any neurological complications. In addition, she had an early spontaneous pregnancy loss (before her last pregnancy). Patient conceived by IVF-ET and PGD and non-invasive prenatal screening showed no increased risk for fetal aneuploidies. PMH: She does not have any chronic medical conditions. PSH: Laparoscopic myomectomy and salpingectomy. Classical  delivery. Hysteroscopies and D&C procedures. No history of cervical surgeries. Gyn history: No history of abnormal Pap smears. History of genital herpes infection with no active lesions now. She reports having breakouts at least twice a year. She denies gonococcal or chlamydial infections. Allergies: NKDA  Medications: Progesterone (vaginal) tablets, prenatal vitamins. Social: Denies tobacco or drug or alcohol. Prenatal course: Patient gives history of vaginal spotting early in pregnancy. On cell-free fetal DNA screening, the risks of aneuploidies were not increased. P/E: Patient is comfortably lying in bed; not in pain. Vitals stable  Abd: Soft; no tenderness. At her visit to the [facility]. on 18, I examined her and counseled on the following:   History of cervical incompetence and  delivery: Based on her history of advanced cervical dilation at 21 weeks gestation, I recommended prophylactic cerclage at 12 to 15 weeks gestation.  Alternatively, we can perform weekly cervical length measurements and perform rescue cerclage procedure when cervical shortening is detected (inferior option). Cerclage, however, does not guarantee carrying pregnancy to term. Although cervical incompetence is one of the causes of  delivery, I counseled and recommended prophylactic progesterone (17-OHP) injections from 16 weeks gestation until 36 weeks. History of  delivery is the single most-common cause of recurrent  birth. I informed her that studies have shown the beneficial effect of progesterone therapy in reducing the incidence of  births in women with history of  birth. I explained that a follow-up of infants (up to 4 years) of mothers who received progesterone during pregnancy have not shown any adverse outcomes. However, long-term effects are not known. Local side effects including itching and redness can occur. Patient agreed to take progesterone injections. History of herpes infection: Patient reports getting recurrent infections. I recommended that she take Valtrex 1 gram daily 5 days before planned cerclage procedure. I explained cerclage procedure again today and its possible complications including miscarriage, bleeding, infection and injuries to bladder or bowel (rare). Patient opted to have cerclage procedure. Informed consent was obtained.

## 2018-06-01 NOTE — PROGRESS NOTES
0705-Received client to [facility]DR#12 for a scheduled cerclage placement at 12+ weeks. 0720-Leobardo Mcclain at bedside talking and assessing client. 0735-Bedside and Verbal shift change report given to VICTORINO Butler RN  (oncoming nurse) by CHEMO Negro RN  (offgoing nurse). Report included the following information SBAR, Kardex and Recent Results.

## 2018-06-01 NOTE — DISCHARGE INSTRUCTIONS
Cervical Cerclage to Prevent  Delivery: What to Expect at Home  Your Recovery  Cervical cerclage (say \"SER-vuh-kul ser-KLAZH\") is a procedure that helps keep your cervix from opening too soon. Your doctor has sewn your cervix shut to help prevent  labor. For the next few days, you may have:  · Cramping. · Spotting. · Pain when you urinate. Your doctor may give you instructions on when you can do your normal activities again, such as driving and going back to work. Your doctor will remove the stitches around your cervix at 37 weeks, or if you go into  labor or show signs of infection. This care sheet gives you a general idea about how long it will take for you to recover. But each person recovers at a different pace. Follow the steps below to get better as quickly as possible. How can you care for yourself at home? Activity  ? · Rest when you feel tired. ? · Ask your doctor when it is okay for you to have sex. Medicines  ? · Be safe with medicines. Read and follow all instructions on the label. ? · If you are not taking a prescription pain medicine, ask your doctor if you can take an over-the-counter medicine. ? · Your doctor will tell you if and when you can restart your medicines. He or she will also give you instructions about taking any new medicines. Follow-up care is a key part of your treatment and safety. Be sure to make and go to all appointments, and call your doctor if you are having problems. It's also a good idea to know your test results and keep a list of the medicines you take. When should you call for help? Call 911 anytime you think you may need emergency care. For example, call if:  ? · You have severe trouble breathing. ? · You have sudden, severe pain in your belly. ? · You have severe vaginal bleeding. ?Call your doctor now or seek immediate medical care if:  ? · You have new pelvic pain, or the pain in your pelvis gets worse.    ? · You have a new discharge from your vagina. ? · You have a new or higher fever. ? Watch closely for changes in your health, and be sure to contact your doctor if you have any problems. Where can you learn more? Go to http://kris-dominguez.info/. Enter O718 in the search box to learn more about \"Cervical Cerclage to Prevent  Delivery: What to Expect at Home. \"  Current as of: 2017  Content Version: 11.4  © 0901-7949 Healthwise, [employer]. Care instructions adapted under license by [employer] (which disclaims liability or warranty for this information). If you have questions about a medical condition or this instruction, always ask your healthcare professional. [address] any warranty or liability for your use of this information.

## 2018-06-01 NOTE — ANESTHESIA POSTPROCEDURE EVALUATION
Post-Anesthesia Evaluation and Assessment    Patient: Kaye Barrett MRN: [id number]  SSN: [id number]    YOB: 1975  Age: 38 y.o. Sex: female       Cardiovascular Function/Vital Signs  Visit Vitals    BP 99/69    Pulse 78    Temp 36.6 °C (97.9 °F)    Resp 16    Ht 5' (1.524 m)    Wt 63.5 kg (140 lb)    SpO2 99%    Breastfeeding No    BMI 27.34 kg/m2       Patient is status post spinal anesthesia for Procedure(s):  CERCLAGE. Nausea/Vomiting: None    Postoperative hydration reviewed and adequate. Pain:  Pain Scale 1: Numeric (0 - 10) (06/01/18 0925)  Pain Intensity 1: 3 (06/01/18 0925)   Managed    Neurological Status:   Neuro (WDL): Within Defined Limits (06/01/18 0925)   Block resolving    Mental Status and Level of Consciousness: Alert and oriented     Pulmonary Status:   O2 Device: Room air (06/01/18 0925)   Adequate oxygenation and airway patent    Complications related to anesthesia: None    Post-anesthesia assessment completed.  No concerns    Signed By: Mojgan Menendez MD     June 1, 2018

## 2018-06-01 NOTE — ANESTHESIA PROCEDURE NOTES
Spinal Block    Start time: 6/1/2018 8:24 AM  End time: 6/1/2018 8:26 AM  Performed by: Magdiel Araujo  Authorized by: Magdiel Araujo     Pre-procedure:   Indications: primary anesthetic  Preanesthetic Checklist: patient identified, risks and benefits discussed, site marked, patient being monitored and timeout performed      Spinal Block:   Patient Position:  Seated  Prep Region:  Lumbar  Prep: Betadine      Location:  L2-3  Technique:  Single shot  Local:  Lidocaine 1%  Local Dose (mL):  3    Needle:   Needle Type:  Pencil-tip  Needle Gauge:  24 G  Attempts:  1      Events: CSF confirmed, no blood with aspiration and no paresthesia        Assessment:  Insertion:  Uncomplicated  Patient tolerance:  Patient tolerated the procedure well with no immediate complications

## 2018-06-01 NOTE — PROGRESS NOTES
Postoperative Note    Patient is comfortable; has minimal abdominal cramping. She has not voided yet. Vitals stable  Abd: Soft; no tenderness. I explained the procedure and cervical anatomy with help of diagrams. I reassured her that prophylactic cerclage and progesterone injections should lead to good pregnancy outcome. Patient can be discharged after voiding.     Follow-up appointment on 06/13/18 at 9:30 AM

## 2018-06-01 NOTE — PROGRESS NOTES
Bedside shift change report given to Adeola Hastings rN (oncoming nurse) by Cris Mcguire RN (offgoing nurse). Report included the following information SBAR, Kardex, Intake/Output and MAR.   0915 pt back to room post cerclage, tolerated well. 1100 pt ate lunch and tolerated well. 1315 attempted to get pt up to bathroom, not quite able to ambulate yet. 1530 pt able to void    1550 pt discharged and wheeled out to vehicle.

## 2018-06-01 NOTE — IP AVS SNAPSHOT
[address] [address] 
[phone] Patient: Chago Scruggs MRN: [id number] TDQ: About your hospitalization You were admitted on:  2018 You last received care in the:  [facility] 3 LABOR & DELIVERY You were discharged on:  2018 Why you were hospitalized Your primary diagnosis was:  Not on File Your diagnoses also included:  Incompetent Cervix Follow-up Information Follow up With Details Comments Contact Info Jake Lee MD   [address] [facility] [address] [place] [address] 
[phone] Discharge Orders None A check dony indicates which time of day the medication should be taken. My Medications CONTINUE taking these medications Instructions Each Dose to Equal  
 Morning Noon Evening Bedtime  
 prenatal multivit-ca-min-fe-fa Tab Your last dose was: Your next dose is: Take  by mouth. STOP taking these medications   
 aspirin 81 mg chewable tablet  
   
  
 progesterone 100 mg capsule Commonly known as:  PROMETRIUM  
   
  
 VITAMIN B-6 25 mg tablet Generic drug:  pyridoxine (vitamin B6) Discharge Instructions Cervical Cerclage to Prevent  Delivery: What to Expect at [facility] Your Recovery Cervical cerclage (say \"SER-vuh-kul ser-KLAZH\") is a procedure that helps keep your cervix from opening too soon. Your doctor has sewn your cervix shut to help prevent  labor. For the next few days, you may have: · Cramping. · Spotting. · Pain when you urinate. Your doctor may give you instructions on when you can do your normal activities again, such as driving and going back to work. Your doctor will remove the stitches around your cervix at 37 weeks, or if you go into  labor or show signs of infection. This care sheet gives you a general idea about how long it will take for you to recover. But each person recovers at a different pace. Follow the steps below to get better as quickly as possible. How can you care for yourself at home? Activity ? · Rest when you feel tired. ? · Ask your doctor when it is okay for you to have sex. Medicines ? · Be safe with medicines. Read and follow all instructions on the label. ? · If you are not taking a prescription pain medicine, ask your doctor if you can take an over-the-counter medicine. ? · Your doctor will tell you if and when you can restart your medicines. He or she will also give you instructions about taking any new medicines. Follow-up care is a key part of your treatment and safety. Be sure to make and go to all appointments, and call your doctor if you are having problems. It's also a good idea to know your test results and keep a list of the medicines you take. When should you call for help? Call 911 anytime you think you may need emergency care. For example, call if: 
? · You have severe trouble breathing. ? · You have sudden, severe pain in your belly. ? · You have severe vaginal bleeding. ?Call your doctor now or seek immediate medical care if: 
? · You have new pelvic pain, or the pain in your pelvis gets worse. ? · You have a new discharge from your vagina. ? · You have a new or higher fever. ? Watch closely for changes in your health, and be sure to contact your doctor if you have any problems. Where can you learn more? Go to http://kris-dominguez.info/. Enter Q333 in the search box to learn more about \"Cervical Cerclage to Prevent  Delivery: What to Expect at Home. \" Current as of: 2017 Content Version: 11.4 © 7218-6262 Healthwise, Incorporated.  Care instructions adapted under license by [employer] (which disclaims liability or warranty for this information). If you have questions about a medical condition or this instruction, always ask your healthcare professional. [address] any warranty or liability for your use of this information. Introducing hospitals & HEALTH SERVICES! Gigi Soriano: Thank you for requesting a [employer] 1 account. Our records indicate that you already have an active Opt[employer] 1 account. You can access your account anytime at [email or web address] Did you know that you can access your hospital and ER discharge instructions at any time in Optics 1? You can also review all of your test results from your hospital stay or ER visit. Additional Information If you have questions, please visit the Frequently Asked Questions section of the Optics 1 website at [email or web address] Remember, Optics 1 is NOT to be used for urgent needs. For medical emergencies, dial 911. Now available from your iPhone and Android! Introducing Vladimir Aguilar As a Carlinesonya Alfonso patient, I wanted to make you aware of our electronic visit tool called Vladimir Aguilar. Carline Alfonso 24/7 allows you to connect within minutes with a medical provider 24 hours a day, seven days a week via a mobile device or tablet or logging into a secure website from your computer. You can access Vladimir Cedilloochoafin from anywhere in the [place]. A virtual visit might be right for you when you have a simple condition and feel like you just dont want to get out of bed, or cant get away from work for an appointment, when your regular Carline Benzist provider is not available (evenings, weekends or holidays), or when youre out of town and need minor care. Electronic visits cost only $49 and if the Carline Alfonso 24/7 provider determines a prescription is needed to treat your condition, one can be electronically transmitted to a nearby pharmacy*. Please take a moment to enroll today if you have not already done so. The enrollment process is free and takes just a few minutes. To enroll, please download the [employer] 24/7 coni to your tablet or phone, or visit [email or web address] to enroll on your computer. And, as an [address] [place] [address] patient with a [employer] account, the results of your visits will be scanned into your electronic medical record and your primary care provider will be able to view the scanned results. We urge you to continue to see your regular [facility] provider for your ongoing medical care. And while your primary care provider may not be the one available when you seek a minicabitochoafin virtual visit, the peace of mind you get from getting a real diagnosis real time can be priceless. For more information on minicabitochoafin, view our Frequently Asked Questions (FAQs) at [email or web address]. Sincerely, 
 
Diane Dougherty MD 
Chief Medical Officer [address] [place] [address] Jesus *:  certain medications cannot be prescribed via [employer] Providers Seen During Your Hospitalization Provider Specialty Primary office phone Jacquelin Burgess MD Maternal . Fetal Medicine [phone] Roro Odell MD Gynecology [phone] Your Primary Care Physician (PCP) Primary Care Physician Office Phone Office Fax Horacio Bobby [phone] You are allergic to the following No active allergies Recent Documentation Height Weight Breastfeeding? BMI OB Status Smoking Status 1.524 m 63.5 kg No 27.34 kg/m2 Pregnant Never Smoker Emergency Contacts Name Discharge Info Relation Home Work Mobile Nabor Barton DISCHARGE CAREGIVER [3] Spouse [3] [phone] Patient Belongings  The following personal items are in your possession at time of discharge: 
  Dental Appliances: None  Visual Aid: None      Home Medications: None Mala: None  Clothing: At bedside    Other Valuables: Cell Phone, Purse  Personal Items Sent to Safe: NONE Please provide this summary of care documentation to your next provider. Signatures-by signing, you are acknowledging that this After Visit Summary has been reviewed with you and you have received a copy. Patient Signature:  ____________________________________________________________ Date:  ____________________________________________________________  
  
[facility] Provider Signature:  ____________________________________________________________ Date:  ____________________________________________________________

## 2018-06-13 ENCOUNTER — HOSPITAL ENCOUNTER (OUTPATIENT)
Dept: PERINATAL CARE | Age: 43
Discharge: HOME | End: 2018-06-13
Attending: OBSTETRICS & GYNECOLOGY
Payer: COMMERCIAL

## 2018-06-13 PROCEDURE — 76817 TRANSVAGINAL US OBSTETRIC: CPT | Performed by: OBSTETRICS & GYNECOLOGY

## 2018-07-31 ENCOUNTER — HOSPITAL ENCOUNTER (OUTPATIENT)
Dept: PERINATAL CARE | Age: 43
Discharge: HOME | End: 2018-07-31
Attending: OBSTETRICS & GYNECOLOGY
Payer: COMMERCIAL

## 2018-07-31 PROCEDURE — 76817 TRANSVAGINAL US OBSTETRIC: CPT | Performed by: OBSTETRICS & GYNECOLOGY

## 2018-07-31 PROCEDURE — 76811 OB US DETAILED SNGL FETUS: CPT | Performed by: OBSTETRICS & GYNECOLOGY

## 2018-08-30 ENCOUNTER — HOSPITAL ENCOUNTER (OUTPATIENT)
Dept: PERINATAL CARE | Age: 43
Discharge: HOME | End: 2018-08-30
Attending: OBSTETRICS & GYNECOLOGY
Payer: COMMERCIAL

## 2018-08-30 PROCEDURE — 76816 OB US FOLLOW-UP PER FETUS: CPT | Performed by: OBSTETRICS & GYNECOLOGY

## 2018-08-31 ENCOUNTER — HOSPITAL ENCOUNTER (OUTPATIENT)
Dept: PERINATAL CARE | Age: 43
Discharge: HOME | End: 2018-08-31
Attending: OBSTETRICS & GYNECOLOGY
Payer: COMMERCIAL

## 2018-08-31 PROCEDURE — 76817 TRANSVAGINAL US OBSTETRIC: CPT | Performed by: OBSTETRICS & GYNECOLOGY

## 2018-09-18 ENCOUNTER — HOSPITAL ENCOUNTER (OUTPATIENT)
Dept: PERINATAL CARE | Age: 43
Discharge: HOME | End: 2018-09-18
Attending: OBSTETRICS & GYNECOLOGY
Payer: COMMERCIAL

## 2018-09-18 PROCEDURE — 76816 OB US FOLLOW-UP PER FETUS: CPT | Performed by: OBSTETRICS & GYNECOLOGY

## 2018-09-18 PROCEDURE — 76817 TRANSVAGINAL US OBSTETRIC: CPT | Performed by: OBSTETRICS & GYNECOLOGY

## 2018-11-06 ENCOUNTER — HOSPITAL ENCOUNTER (OUTPATIENT)
Dept: PERINATAL CARE | Age: 43
Discharge: HOME | End: 2018-11-06
Attending: OBSTETRICS & GYNECOLOGY
Payer: COMMERCIAL

## 2018-11-06 PROCEDURE — 76816 OB US FOLLOW-UP PER FETUS: CPT | Performed by: OBSTETRICS & GYNECOLOGY

## 2020-05-22 NOTE — PROGRESS NOTES
Post-Operative  Day 1    [address] for the patient's :  Gwendolyn Flanagan [[id number]]   , Classical   Patient doing well without unusual complaints. Tolerating liquids, no flatus. Feels sore but otherwise ok. Reviewed events of last night. Pt is aware of classical C/S and need for repeat with next pregnancy, no TOLAC    Vitals:  Visit Vitals    /62 (BP 1 Location: Right arm, BP Patient Position: At rest)    Pulse (!) 105    Temp 98.2 °F (36.8 °C)    Resp 16    Ht 5' (1.524 m)    Wt 63.5 kg (140 lb)    SpO2 94%    Breastfeeding Unknown    BMI 27.34 kg/m2     Temp (24hrs), Av.8 °F (37.1 °C), Min:98 °F (36.7 °C), Max:100.2 °F (37.9 °C)      Last 24hr Input/Output:    Intake/Output Summary (Last 24 hours) at 17 0745  Last data filed at 17 0624   Gross per 24 hour   Intake             2310 ml   Output             1400 ml   Net              910 ml          Exam:       Patient without distress. Abdomen:soft, expected tenderness, fundus firm, wound dressing intact     Lower extremities are nontender without edema. No cords.  +SCDs    Labs:   Lab Results   Component Value Date/Time    WBC 11.0 2017 03:15 AM    WBC 8.8 2017 01:01 AM    WBC 5.8 2017 10:57 AM    HGB 8.2 2017 03:15 AM    HGB 9.4 2017 01:01 AM    HGB 10.4 2017 10:57 AM    HCT 25.6 2017 03:15 AM    HCT 29.3 2017 01:01 AM    HCT 31.1 2017 10:57 AM    PLATELET 730  03:15 AM    PLATELET 260  01:01 AM    PLATELET 130  10:57 AM       Recent Results (from the past 24 hour(s))   CBC WITH AUTOMATED DIFF    Collection Time: 17  3:15 AM   Result Value Ref Range    WBC 11.0 3.6 - 11.0 K/uL    RBC 3.21 (L) 3.80 - 5.20 M/uL    HGB 8.2 (L) 11.5 - 16.0 g/dL    HCT 25.6 (L) 35.0 - 47.0 %    MCV 79.8 (L) 80.0 - 99.0 FL    MCH 25.5 (L) 26.0 - 34.0 PG    MCHC 32.0 30.0 - 36.5 g/dL    RDW 16.4 (H) 11.5 - 14.5 % PLATELET 605 (L) 243 - 400 K/uL    NEUTROPHILS 83 (H) 32 - 75 %    BAND NEUTROPHILS 5 %    LYMPHOCYTES 7 (L) 12 - 49 %    MONOCYTES 5 5 - 13 %    EOSINOPHILS 0 0 - 7 %    BASOPHILS 0 0 - 1 %    ABS. NEUTROPHILS 9.6 (H) 1.8 - 8.0 K/UL    ABS. LYMPHOCYTES 0.8 0.8 - 3.5 K/UL    ABS. MONOCYTES 0.6 0.0 - 1.0 K/UL    ABS. EOSINOPHILS 0.0 0.0 - 0.4 K/UL    ABS. BASOPHILS 0.0 0.0 - 0.1 K/UL    DF MANUAL      PLATELET COMMENTS LARGE PLATELETS      RBC COMMENTS ANISOCYTOSIS  1+        RBC COMMENTS MICROCYTOSIS  1+        RBC COMMENTS RYNE CELLS  PRESENT        RBC COMMENTS OVALOCYTES  PRESENT        RBC COMMENTS POLYCHROMASIA  PRESENT        RBC COMMENTS TEARDROP CELLS  PRESENT        WBC COMMENTS TOXIC GRANULATION             Assessment: Post-Op day 1, stable  A+/RI    Plan:   1. Routine post-operative care   2. Advance diet, d/c Garcia, etc.  3. TDAP vax prior to d/c Yes

## (undated) DEVICE — STERILE POLYISOPRENE POWDER-FREE SURGICAL GLOVES: Brand: PROTEXIS

## (undated) DEVICE — SUTURE MERS 5MM L12IN NONABSORBABLE WHT L36MM MO-4 1/2 CIR RS23

## (undated) DEVICE — COVER LT HNDL BLU PLAS

## (undated) DEVICE — ROYALSILK SURGICAL GOWN, L: Brand: CONVERTORS

## (undated) DEVICE — SOLUTION IV 1000ML 0.9% SOD CHL

## (undated) DEVICE — 3000CC GUARDIAN II: Brand: GUARDIAN

## (undated) DEVICE — COVERALL PREM SMS 2XL KNIT --

## (undated) DEVICE — SYR 10ML LUER LOK 1/5ML GRAD --

## (undated) DEVICE — KENDALL SCD EXPRESS SLEEVES, KNEE LENGTH, MEDIUM: Brand: KENDALL SCD

## (undated) DEVICE — INFECTION CONTROL KIT SYS

## (undated) DEVICE — Z INACTIVE NO USAGE TURNOVER KIT RM CLEANOP

## (undated) DEVICE — SPONGE: LAP 18X18 W  200/CS: Brand: MEDICAL ACTION INDUSTRIES

## (undated) DEVICE — PERI/GYN PACK: Brand: CONVERTORS

## (undated) DEVICE — PAD SANIT NPKN 4IN GRD

## (undated) DEVICE — TIP CLEANER: Brand: VALLEYLAB

## (undated) DEVICE — NDL SUT TAPR PT MAYO 0.5 CIR 5 --

## (undated) DEVICE — SOLIDIFIER MEDC 1200ML -- CONVERT TO 356117

## (undated) DEVICE — TRAY PREP DRY W/ PREM GLV 2 APPL 6 SPNG 2 UNDPD 1 OVERWRAP

## (undated) DEVICE — MEDI-VAC NON-CONDUCTIVE SUCTION TUBING: Brand: CARDINAL HEALTH

## (undated) DEVICE — LIGHT HANDLE: Brand: DEVON

## (undated) DEVICE — ANESTHESIA TRAY SPNL W/O NDL PENCAN

## (undated) DEVICE — CATH URETH INTMIT ROB 16FR FUN -- CONVERT TO ITEM 179520

## (undated) DEVICE — Device: Brand: PORTEX

## (undated) DEVICE — TOWEL SURG W17XL27IN STD BLU COT NONFENESTRATED PREWASHED

## (undated) DEVICE — STRAP RESTRAIN W3.5XL19IN TECLIN STRRP POS LEG DURING LITH

## (undated) DEVICE — ROCKER SWITCH PENCIL BLADE ELECTRODE, HOLSTER: Brand: EDGE

## (undated) DEVICE — SURGICAL PROCEDURE PACK BASIN MAJ SET CUST NO CAUT

## (undated) DEVICE — STERILE POLYISOPRENE POWDER-FREE SURGICAL GLOVES WITH EMOLLIENT COATING: Brand: PROTEXIS

## (undated) DEVICE — SUTURE CHROMIC GUT SZ 1 L36IN ABSRB BRN L40MM CT 1/2 CIR 915H

## (undated) DEVICE — SUTURE PDS II SZ 0 L36IN ABSRB VLT L40MM CT 1/2 CIR Z358T

## (undated) DEVICE — DEVON™ KNEE AND BODY STRAP 60" X 3" (1.5 M X 7.6 CM): Brand: DEVON

## (undated) DEVICE — (D)SYR 10ML 1/5ML GRAD NSAF -- PKGING CHANGE USE ITEM 338027

## (undated) DEVICE — PACK PROCEDURE SURG C SECT KT SMH

## (undated) DEVICE — CANNULA CAPNOGRAPHY AD O2 TBNG L7FT FEM LUER STYL 4707F25] SALTER LABS INC]

## (undated) DEVICE — REM POLYHESIVE ADULT PATIENT RETURN ELECTRODE: Brand: VALLEYLAB

## (undated) DEVICE — SUTURE MCRYL SZ 0 L36IN ABSRB UD L36MM CT-1 1/2 CIR Y946H

## (undated) DEVICE — DRAPE FLD WRM W44XL66IN C6L FOR INTRATEMP SYS THERMABASIN

## (undated) DEVICE — CATH FOLEY 16F LUBRI-SIL IC --

## (undated) DEVICE — POOLE SUCTION INSTRUMENT WITH REMOVABLE SHEATH: Brand: POOLE

## (undated) DEVICE — STAPLER SKIN LEG L3.9MM DIA0.53MM WIDE ROT HD FOR WND CLSR

## (undated) DEVICE — SOLUTION IRRIG 1000ML H2O STRL BLT

## (undated) DEVICE — S/USE RESUS KIT W/O MASK (10): Brand: FISHER & PAYKEL HEALTHCARE

## (undated) DEVICE — (D)PREP SKN CHLRAPRP APPL 26ML -- CONVERT TO ITEM 371833